# Patient Record
Sex: FEMALE | Race: WHITE | Employment: FULL TIME | URBAN - NONMETROPOLITAN AREA
[De-identification: names, ages, dates, MRNs, and addresses within clinical notes are randomized per-mention and may not be internally consistent; named-entity substitution may affect disease eponyms.]

---

## 2018-12-08 ENCOUNTER — HOSPITAL ENCOUNTER (OUTPATIENT)
Age: 58
Discharge: HOME | End: 2018-12-08
Payer: COMMERCIAL

## 2018-12-08 LAB
ALBUMIN SERPL-MCNC: 4.8 GM/DL (ref 3.4–5)
ALP BLD-CCNC: 89 IU/L (ref 40–129)
ALT SERPL-CCNC: 13 U/L (ref 10–40)
ANION GAP SERPL CALCULATED.3IONS-SCNC: 10 MMOL/L (ref 4–16)
AST SERPL-CCNC: 16 IU/L (ref 15–37)
BASOPHILS ABSOLUTE: 0 K/CU MM
BASOPHILS RELATIVE PERCENT: 0.5 % (ref 0–1)
BILIRUB SERPL-MCNC: 0.7 MG/DL (ref 0–1)
BUN BLDV-MCNC: 12 MG/DL (ref 6–23)
CALCIUM SERPL-MCNC: 9.8 MG/DL (ref 8.3–10.6)
CHLORIDE BLD-SCNC: 102 MMOL/L (ref 99–110)
CO2: 30 MMOL/L (ref 21–32)
CREAT SERPL-MCNC: 0.7 MG/DL (ref 0.6–1.1)
DIFFERENTIAL TYPE: ABNORMAL
EOSINOPHILS ABSOLUTE: 0.4 K/CU MM
EOSINOPHILS RELATIVE PERCENT: 5.3 % (ref 0–3)
GFR AFRICAN AMERICAN: >60 ML/MIN/1.73M2
GFR NON-AFRICAN AMERICAN: >60 ML/MIN/1.73M2
GLUCOSE FASTING: 90 MG/DL (ref 70–99)
HCT VFR BLD CALC: 41.8 % (ref 37–47)
HEMOGLOBIN: 13.4 GM/DL (ref 12.5–16)
IMMATURE NEUTROPHIL %: 0.3 % (ref 0–0.43)
LYMPHOCYTES ABSOLUTE: 1.6 K/CU MM
LYMPHOCYTES RELATIVE PERCENT: 23.7 % (ref 24–44)
MCH RBC QN AUTO: 32.2 PG (ref 27–31)
MCHC RBC AUTO-ENTMCNC: 32.1 % (ref 32–36)
MCV RBC AUTO: 100.5 FL (ref 78–100)
MONOCYTES ABSOLUTE: 0.5 K/CU MM
MONOCYTES RELATIVE PERCENT: 7.8 % (ref 0–4)
PDW BLD-RTO: 11.9 % (ref 11.7–14.9)
PLATELET # BLD: 308 K/CU MM (ref 140–440)
PMV BLD AUTO: 10 FL (ref 7.5–11.1)
POTASSIUM SERPL-SCNC: 4.1 MMOL/L (ref 3.5–5.1)
RBC # BLD: 4.16 M/CU MM (ref 4.2–5.4)
SEGMENTED NEUTROPHILS ABSOLUTE COUNT: 4.1 K/CU MM
SEGMENTED NEUTROPHILS RELATIVE PERCENT: 62.4 % (ref 36–66)
SODIUM BLD-SCNC: 142 MMOL/L (ref 135–145)
TOTAL IMMATURE NEUTOROPHIL: 0.02 K/CU MM
TOTAL PROTEIN: 6.9 GM/DL (ref 6.4–8.2)
WBC # BLD: 6.6 K/CU MM (ref 4–10.5)

## 2018-12-08 PROCEDURE — 84443 ASSAY THYROID STIM HORMONE: CPT

## 2018-12-08 PROCEDURE — 80061 LIPID PANEL: CPT

## 2018-12-08 PROCEDURE — 36415 COLL VENOUS BLD VENIPUNCTURE: CPT

## 2018-12-08 PROCEDURE — 80053 COMPREHEN METABOLIC PANEL: CPT

## 2018-12-08 PROCEDURE — 85025 COMPLETE CBC W/AUTO DIFF WBC: CPT

## 2018-12-08 PROCEDURE — 82306 VITAMIN D 25 HYDROXY: CPT

## 2018-12-09 LAB
CHOLESTEROL, FASTING: 181 MG/DL
HDLC SERPL-MCNC: 58 MG/DL
LDL CHOLESTEROL DIRECT: 107 MG/DL
TRIGLYCERIDE, FASTING: 183 MG/DL
TSH HIGH SENSITIVITY: 1.44 UIU/ML (ref 0.27–4.2)
VITAMIN D 25-HYDROXY: 50.7 NG/ML

## 2018-12-14 ENCOUNTER — HOSPITAL ENCOUNTER (OUTPATIENT)
Dept: WOMENS IMAGING | Age: 58
Discharge: HOME | End: 2018-12-14
Payer: COMMERCIAL

## 2018-12-14 DIAGNOSIS — M85.9 DISORDER OF BONE DENSITY AND STRUCTURE, UNSPECIFIED: ICD-10-CM

## 2018-12-14 PROCEDURE — 77080 DXA BONE DENSITY AXIAL: CPT

## 2018-12-28 ENCOUNTER — HOSPITAL ENCOUNTER (OUTPATIENT)
Dept: MRI IMAGING | Age: 58
Discharge: HOME | End: 2018-12-28
Payer: COMMERCIAL

## 2018-12-28 DIAGNOSIS — R92.2 BREAST DENSITY: ICD-10-CM

## 2018-12-28 LAB
GFR AFRICAN AMERICAN: >60 ML/MIN/1.73M2
GFR NON-AFRICAN AMERICAN: >60 ML/MIN/1.73M2
POC CREATININE: 0.6 MG/DL (ref 0.6–1.1)

## 2018-12-28 PROCEDURE — 6360000004 HC RX CONTRAST MEDICATION: Performed by: FAMILY MEDICINE

## 2018-12-28 PROCEDURE — C8908 MRI W/O FOL W/CONT, BREAST,: HCPCS

## 2018-12-28 PROCEDURE — A9577 INJ MULTIHANCE: HCPCS | Performed by: FAMILY MEDICINE

## 2018-12-28 RX ADMIN — GADOBENATE DIMEGLUMINE 12 ML: 529 INJECTION, SOLUTION INTRAVENOUS at 10:10

## 2019-02-18 ENCOUNTER — OFFICE VISIT (OUTPATIENT)
Dept: FAMILY MEDICINE CLINIC | Age: 59
End: 2019-02-18
Payer: COMMERCIAL

## 2019-02-18 VITALS
HEART RATE: 83 BPM | DIASTOLIC BLOOD PRESSURE: 84 MMHG | RESPIRATION RATE: 16 BRPM | HEIGHT: 66 IN | SYSTOLIC BLOOD PRESSURE: 120 MMHG | WEIGHT: 142 LBS | BODY MASS INDEX: 22.82 KG/M2

## 2019-02-18 DIAGNOSIS — F32.1 CURRENT MODERATE EPISODE OF MAJOR DEPRESSIVE DISORDER WITHOUT PRIOR EPISODE (HCC): ICD-10-CM

## 2019-02-18 DIAGNOSIS — Z13.31 POSITIVE DEPRESSION SCREENING: ICD-10-CM

## 2019-02-18 DIAGNOSIS — H81.13 BENIGN PAROXYSMAL POSITIONAL VERTIGO DUE TO BILATERAL VESTIBULAR DISORDER: ICD-10-CM

## 2019-02-18 DIAGNOSIS — R03.0 ELEVATED BLOOD PRESSURE READING: Primary | ICD-10-CM

## 2019-02-18 PROBLEM — E61.1 IRON DEFICIENCY: Status: ACTIVE | Noted: 2019-02-18

## 2019-02-18 PROBLEM — I10 ESSENTIAL HYPERTENSION: Status: ACTIVE | Noted: 2019-02-18

## 2019-02-18 PROBLEM — R42 VERTIGO: Status: ACTIVE | Noted: 2019-02-18

## 2019-02-18 PROCEDURE — G0444 DEPRESSION SCREEN ANNUAL: HCPCS | Performed by: PHYSICIAN ASSISTANT

## 2019-02-18 PROCEDURE — G8431 POS CLIN DEPRES SCRN F/U DOC: HCPCS | Performed by: PHYSICIAN ASSISTANT

## 2019-02-18 PROCEDURE — 99203 OFFICE O/P NEW LOW 30 MIN: CPT | Performed by: PHYSICIAN ASSISTANT

## 2019-02-18 RX ORDER — MECLIZINE HYDROCHLORIDE 25 MG/1
TABLET ORAL
COMMUNITY
Start: 2019-02-16 | End: 2020-12-27

## 2019-02-18 RX ORDER — BUPROPION HYDROCHLORIDE 150 MG/1
TABLET, EXTENDED RELEASE ORAL
Refills: 3 | COMMUNITY
Start: 2018-12-14 | End: 2020-12-27

## 2019-02-18 RX ORDER — DULOXETIN HYDROCHLORIDE 60 MG/1
CAPSULE, DELAYED RELEASE ORAL
COMMUNITY

## 2019-02-18 RX ORDER — FERROUS SULFATE 325(65) MG
65 TABLET ORAL
COMMUNITY

## 2019-02-18 ASSESSMENT — ENCOUNTER SYMPTOMS
COUGH: 0
SHORTNESS OF BREATH: 0
CHEST TIGHTNESS: 0
COLOR CHANGE: 0
VOMITING: 0
NAUSEA: 0
TROUBLE SWALLOWING: 0
DIARRHEA: 0
ABDOMINAL PAIN: 0

## 2019-02-18 ASSESSMENT — PATIENT HEALTH QUESTIONNAIRE - PHQ9
6. FEELING BAD ABOUT YOURSELF - OR THAT YOU ARE A FAILURE OR HAVE LET YOURSELF OR YOUR FAMILY DOWN: 0
SUM OF ALL RESPONSES TO PHQ QUESTIONS 1-9: 15
5. POOR APPETITE OR OVEREATING: 2
2. FEELING DOWN, DEPRESSED OR HOPELESS: 3
1. LITTLE INTEREST OR PLEASURE IN DOING THINGS: 3
4. FEELING TIRED OR HAVING LITTLE ENERGY: 3
SUM OF ALL RESPONSES TO PHQ QUESTIONS 1-9: 15
8. MOVING OR SPEAKING SO SLOWLY THAT OTHER PEOPLE COULD HAVE NOTICED. OR THE OPPOSITE, BEING SO FIGETY OR RESTLESS THAT YOU HAVE BEEN MOVING AROUND A LOT MORE THAN USUAL: 1
7. TROUBLE CONCENTRATING ON THINGS, SUCH AS READING THE NEWSPAPER OR WATCHING TELEVISION: 0
3. TROUBLE FALLING OR STAYING ASLEEP: 3
9. THOUGHTS THAT YOU WOULD BE BETTER OFF DEAD, OR OF HURTING YOURSELF: 0
SUM OF ALL RESPONSES TO PHQ9 QUESTIONS 1 & 2: 6
10. IF YOU CHECKED OFF ANY PROBLEMS, HOW DIFFICULT HAVE THESE PROBLEMS MADE IT FOR YOU TO DO YOUR WORK, TAKE CARE OF THINGS AT HOME, OR GET ALONG WITH OTHER PEOPLE: 2

## 2019-10-18 ENCOUNTER — HOSPITAL ENCOUNTER (OUTPATIENT)
Dept: WOMENS IMAGING | Age: 59
Discharge: HOME | End: 2019-10-18
Payer: COMMERCIAL

## 2019-10-18 DIAGNOSIS — Z12.31 VISIT FOR SCREENING MAMMOGRAM: ICD-10-CM

## 2019-10-18 PROCEDURE — 77063 BREAST TOMOSYNTHESIS BI: CPT

## 2020-12-27 ENCOUNTER — HOSPITAL ENCOUNTER (EMERGENCY)
Age: 60
Discharge: HOME | End: 2020-12-27
Payer: COMMERCIAL

## 2020-12-27 VITALS
HEIGHT: 65 IN | DIASTOLIC BLOOD PRESSURE: 66 MMHG | OXYGEN SATURATION: 96 % | WEIGHT: 140 LBS | HEART RATE: 77 BPM | BODY MASS INDEX: 23.32 KG/M2 | RESPIRATION RATE: 16 BRPM | SYSTOLIC BLOOD PRESSURE: 137 MMHG | TEMPERATURE: 98.7 F

## 2020-12-27 DIAGNOSIS — Z20.822 SUSPECTED COVID-19 VIRUS INFECTION: Primary | ICD-10-CM

## 2020-12-27 PROCEDURE — U0003 INFECTIOUS AGENT DETECTION BY NUCLEIC ACID (DNA OR RNA); SEVERE ACUTE RESPIRATORY SYNDROME CORONAVIRUS 2 (SARS-COV-2) (CORONAVIRUS DISEASE [COVID-19]), AMPLIFIED PROBE TECHNIQUE, MAKING USE OF HIGH THROUGHPUT TECHNOLOGIES AS DESCRIBED BY CMS-2020-01-R: HCPCS

## 2020-12-27 PROCEDURE — 99214 OFFICE O/P EST MOD 30 MIN: CPT | Performed by: NURSE PRACTITIONER

## 2020-12-27 PROCEDURE — 99212 OFFICE O/P EST SF 10 MIN: CPT

## 2020-12-27 RX ORDER — AZITHROMYCIN 250 MG/1
TABLET, FILM COATED ORAL
Qty: 6 TABLET | Refills: 0 | Status: SHIPPED | OUTPATIENT
Start: 2020-12-27 | End: 2021-01-01

## 2020-12-27 RX ORDER — BENZONATATE 200 MG/1
200 CAPSULE ORAL 3 TIMES DAILY PRN
Qty: 21 CAPSULE | Refills: 0 | Status: SHIPPED | OUTPATIENT
Start: 2020-12-27 | End: 2021-01-03

## 2020-12-27 RX ORDER — PREDNISONE 20 MG/1
20 TABLET ORAL 2 TIMES DAILY
Qty: 10 TABLET | Refills: 0 | Status: SHIPPED | OUTPATIENT
Start: 2020-12-27 | End: 2021-01-01

## 2020-12-27 ASSESSMENT — ENCOUNTER SYMPTOMS
ALLERGIC/IMMUNOLOGIC NEGATIVE: 1
EYE REDNESS: 0
NAUSEA: 0
EYE PAIN: 0
COUGH: 1
VOMITING: 0
SORE THROAT: 1
EYE DISCHARGE: 0
ABDOMINAL PAIN: 0
SHORTNESS OF BREATH: 0
BACK PAIN: 0
TROUBLE SWALLOWING: 0
CONSTIPATION: 0
DIARRHEA: 0
RHINORRHEA: 0

## 2020-12-27 ASSESSMENT — PAIN DESCRIPTION - PROGRESSION: CLINICAL_PROGRESSION: NOT CHANGED

## 2020-12-27 ASSESSMENT — PAIN DESCRIPTION - DESCRIPTORS: DESCRIPTORS: ACHING

## 2020-12-27 ASSESSMENT — PAIN DESCRIPTION - ONSET: ONSET: GRADUAL

## 2020-12-27 ASSESSMENT — PAIN DESCRIPTION - LOCATION: LOCATION: OTHER (COMMENT)

## 2020-12-27 NOTE — ED PROVIDER NOTES
[place] [address]  Urgent Care Encounter      CHIEF COMPLAINT       Chief Complaint   Patient presents with    Concern For COVID-19    Cough    Fever    Pharyngitis       Nurses Notes reviewed and I agree except as noted in the HPI. HISTORY OF PRESENT ILLNESS   Rosina Enciso is a 56 y.o. female who presents with 1 day of cough congestion, fever, runny nose, headache, body aches, fatigue and sore throat. Denies nausea vomiting or diarrhea, wheezing or stridor, chest or abdominal pain. Patient is a non-smoker with no history of asthma. REVIEW OF SYSTEMS     Review of Systems   Constitutional: Positive for activity change, appetite change, chills and fever. Negative for fatigue. HENT: Positive for congestion and sore throat. Negative for ear pain, postnasal drip, rhinorrhea and trouble swallowing. Eyes: Negative for pain, discharge and redness. Respiratory: Positive for cough. Negative for shortness of breath. Cardiovascular: Negative. Negative for chest pain. Gastrointestinal: Negative for abdominal pain, constipation, diarrhea, nausea and vomiting. Endocrine: Negative. Genitourinary: Negative for dysuria, frequency and urgency. Musculoskeletal: Positive for myalgias. Negative for arthralgias and back pain. Skin: Negative for rash. Allergic/Immunologic: Negative. Negative for environmental allergies. Neurological: Negative for dizziness, tremors, weakness and headaches. Hematological: Negative. Psychiatric/Behavioral: Negative for dysphoric mood and sleep disturbance. The patient is not nervous/anxious. PAST MEDICAL HISTORY         Diagnosis Date    B12 deficiency     Iron deficiency     Osteoporosis        SURGICAL HISTORY     Patient  has a past surgical history that includes Breast biopsy (Right).     CURRENT MEDICATIONS       Discharge Medication List as of 12/27/2020  3:04 PM      CONTINUE these medications which have NOT CHANGED Details   DULoxetine (CYMBALTA) 60 MG extended release capsule Cymbalta 60 mg capsule,delayed release   Take 1 capsule every day by oral route. Historical Med      vitamin D 1000 units CAPS Take 1,000 Units by mouthHistorical Med      ferrous sulfate 325 (65 Fe) MG tablet Take 65 mg by mouthHistorical Med      Multiple Vitamins-Minerals (MULTIVITAMIN ADULT PO) multivitaminHistorical Med      Cyanocobalamin 1000 MCG/ML KIT vitamin B12-folic acid injection solutionHistorical Med             ALLERGIES     Patient is has No Known Allergies. FAMILY HISTORY     Patient'sfamily history includes Cancer in her maternal grandmother and mother; No Known Problems in her father, maternal grandfather, paternal grandfather, and paternal grandmother. SOCIAL HISTORY     Patient  reports that she has never smoked. She has never used smokeless tobacco. She reports that she does not drink alcohol or use drugs. PHYSICAL EXAM     ED TRIAGE VITALS  BP: 137/66, Temp: 98.7 °F (37.1 °C), Pulse: 77, Resp: 16, SpO2: 96 %  Physical Exam  Vitals signs reviewed. Constitutional:       General: She is not in acute distress. Appearance: Normal appearance. She is well-developed. She is ill-appearing. She is not toxic-appearing or diaphoretic. HENT:      Head: Normocephalic. No right periorbital erythema or left periorbital erythema. Jaw: No trismus. Right Ear: Hearing, ear canal and external ear normal. Tympanic membrane is bulging. Left Ear: Hearing, ear canal and external ear normal. Tympanic membrane is bulging. Nose: Mucosal edema and congestion present. No rhinorrhea. Mouth/Throat:      Mouth: Mucous membranes are moist.      Dentition: Normal dentition. Pharynx: Uvula midline. Posterior oropharyngeal erythema present. Tonsils: No tonsillar exudate. 0 on the right. 0 on the left. Eyes:      General: Lids are normal.         Right eye: No discharge. Left eye: No discharge. Conjunctiva/sclera: Conjunctivae normal.      Right eye: Right conjunctiva is not injected. No chemosis. Left eye: No chemosis. Pupils: Pupils are equal, round, and reactive to light. Neck:      Musculoskeletal: Full passive range of motion without pain and normal range of motion. Vascular: No JVD. Trachea: Trachea normal.   Cardiovascular:      Rate and Rhythm: Normal rate and regular rhythm. Heart sounds: Normal heart sounds. No murmur. Pulmonary:      Effort: Pulmonary effort is normal. No respiratory distress. Breath sounds: Normal breath sounds. No stridor. No wheezing. Abdominal:      General: Bowel sounds are normal.      Palpations: Abdomen is soft. Tenderness: There is no abdominal tenderness. Musculoskeletal: Normal range of motion. General: No tenderness or signs of injury. Lymphadenopathy:      Cervical: No cervical adenopathy. Skin:     General: Skin is warm and dry. Capillary Refill: Capillary refill takes less than 2 seconds. Coloration: Skin is pale. Findings: No rash. Neurological:      Mental Status: She is oriented to person, place, and time. She is lethargic. GCS: GCS eye subscore is 4. GCS verbal subscore is 5. GCS motor subscore is 6. Cranial Nerves: No cranial nerve deficit. Coordination: Coordination normal.      Gait: Gait normal.   Psychiatric:         Mood and Affect: Mood is not anxious or depressed. Speech: Speech normal.         Behavior: Behavior normal. Behavior is not withdrawn or hyperactive. Behavior is cooperative. Thought Content: Thought content normal.         Judgment: Judgment normal.         DIAGNOSTIC RESULTS   Labs: No results found for this visit on 12/27/20.     IMAGING:    URGENT CARE COURSE:     Vitals:    12/27/20 1445   BP: 137/66   Pulse: 77   Resp: 16   Temp: 98.7 °F (37.1 °C)   TempSrc: Temporal   SpO2: 96%   Weight: 140 lb (63.5 kg)   Height: 5' 5\" (1.651 m) Medications - No data to display  PROCEDURES:  None  FINAL IMPRESSION      1. Suspected COVID-19 virus infection        DISPOSITION/PLAN   DISPOSITION Decision To Discharge 12/27/2020 03:03:06 PM    PATIENT REFERRED TO:  Veronika Keita DO  [address] [place] [address], [address]  Angelo Murguia [birth year]      As needed    DISCHARGE MEDICATIONS:  Discharge Medication List as of 12/27/2020  3:04 PM      START taking these medications    Details   azithromycin (ZITHROMAX) 250 MG tablet Take 2 tablets by mouth daily for 1 day, THEN 1 tablet daily for 4 days. , Disp-6 tablet, R-0Normal      predniSONE (DELTASONE) 20 MG tablet Take 1 tablet by mouth 2 times daily for 5 days, Disp-10 tablet, R-0Normal      benzonatate (TESSALON) 200 MG capsule Take 1 capsule by mouth 3 times daily as needed for Cough, Disp-21 capsule, R-0Normal           Discharge Medication List as of 12/27/2020  3:04 PM          LASHANDA Lopez CNP, APRN - CNP  12/27/20 1527

## 2020-12-28 ENCOUNTER — CARE COORDINATION (OUTPATIENT)
Dept: CARE COORDINATION | Age: 60
End: 2020-12-28

## 2020-12-28 LAB
PERFORMING LAB: ABNORMAL
REPORT: ABNORMAL
SARS-COV-2: DETECTED

## 2020-12-28 NOTE — CARE COORDINATION
Attempted outreach for SOPCT77 monitoring. Left message to return phone call to ambulatory care manager at [phone]. 2nd attempt to reach for covid19 monitoring. Left message to return phone call to ambulatory care manager at [phone]. If no call back, unable to complete follow up.

## 2020-12-29 NOTE — CARE COORDINATION
Patient contacted regarding [id number] diagnosis\". Discussed COVID-19 related testing which was available at this time. Test results were positive. Patient informed of results, if available? Yes    Care Transition Nurse/ Ambulatory Care Manager contacted the patient by telephone to perform post discharge assessment. Call within 2 business days of discharge: Yes. Verified name and  with patient as identifiers. Provided introduction to self, and explanation of the CTN/ACM role, and reason for call due to risk factors for infection and/or exposure to COVID-19. Symptoms reviewed with patient who verbalized the following symptoms: fatigue, cough and sore throat. Due to no new or worsening symptoms encounter was not routed to provider for escalation. Discussed follow-up appointments. If no appointment was previously scheduled, appointment scheduling offered: Yes  [facility] follow up appointment(s):   Future Appointments   Date Time Provider Xuan Dominique   2021  2:20 PM STR MAMMOGRAPHY [address] [facility] Radiolog   2021  2:50 PM [facility]     [facility] follow up appointment(s): patient calling PCP    Non-face-to-face services provided:  Obtained and reviewed discharge summary and/or continuity of care documents  loop enrollment     Advance Care Planning:   Does patient have an Advance Directive:  reviewed and current. Patient has following risk factors of: no known risk factors. CTN/ACM reviewed discharge instructions, medical action plan and red flags such as increased shortness of breath, increasing fever and signs of decompensation with patient who verbalized understanding. Discussed exposure protocols and quarantine with CDC Guidelines What to do if you are sick with coronavirus disease .  Patient was given an opportunity for questions and concerns.  The patient agrees to contact the [facility] exposure line [phone], [facility]

## 2021-01-02 ENCOUNTER — CARE COORDINATION (OUTPATIENT)
Dept: CASE MANAGEMENT | Age: 61
End: 2021-01-02

## 2021-01-02 NOTE — CARE COORDINATION
Yellow alert noted in Loop remote symptom monitoring program. Messaged patient to notify Israel Bowser if symptoms have worsened since yesterday. Sent comment:    \"Just wanted to let you know that The Get Well Team at [facility] ([facility]) has received your message. Thanks for letting us know. Please let us know if any of your symptoms are worse than yesterday, or if you have any questions or concerns and would like to speak with a nurse. We are available 8-4 M-F, and 9-1 S/S.  My number is [phone]\"     BENOIT PatelN, RN   [address] [place] [address] Transition Nurse  [phone]

## 2021-01-03 ENCOUNTER — CARE COORDINATION (OUTPATIENT)
Dept: CASE MANAGEMENT | Age: 61
End: 2021-01-03

## 2021-01-03 NOTE — CARE COORDINATION
Yellow alert noted in Loop remote symptom monitoring program. Messaged patient to notify Richardnarda Yokasta if symptoms have worsened since yesterday. Sent comment:    \"Just wanted to let you know that The Get Well Team at [facility] ([facility]) has received your message. Thanks for letting us know. Please let us know if any of your symptoms are worse than yesterday, or if you have any questions or concerns and would like to speak with a nurse. We are available 8-4 M-F, and 9-1 S/S.  My number is [phone]\"     BENOIT WagnerN, RN   [address] [place] [address] Transition Nurse  [phone]

## 2021-01-06 ENCOUNTER — CARE COORDINATION (OUTPATIENT)
Dept: CARE COORDINATION | Age: 61
End: 2021-01-06

## 2021-01-06 NOTE — CARE COORDINATION
Yellow alert noted in Loop remote symptom monitoring program. Messaged patient to notify Smooth James if symptoms have worsened since yesterday. Thank you for checking in with us. We would like to know if your symptoms are improving since you were seen? If they are not or if you would like to speak with a nurse, please let us know. Please make sure you are drinking plenty of water, eating nutritious meals, and getting plenty of rest. Seek emergency medical care immediately if you have trouble breathing, persistent pain or pressure in your chest.  Loop nurses are available M-F 8-4 and S/S 9-1. We hope that you are improving each day.  Latosha Chavira RN

## 2021-01-18 ENCOUNTER — HOSPITAL ENCOUNTER (OUTPATIENT)
Dept: WOMENS IMAGING | Age: 61
Discharge: HOME | End: 2021-01-18
Payer: COMMERCIAL

## 2021-01-18 DIAGNOSIS — Z12.31 VISIT FOR SCREENING MAMMOGRAM: ICD-10-CM

## 2021-01-18 DIAGNOSIS — M81.0 OSTEOPOROSIS, UNSPECIFIED OSTEOPOROSIS TYPE, UNSPECIFIED PATHOLOGICAL FRACTURE PRESENCE: ICD-10-CM

## 2021-01-18 DIAGNOSIS — M85.9 DISORDER OF BONE DENSITY AND STRUCTURE, UNSPECIFIED: ICD-10-CM

## 2021-01-18 PROCEDURE — 77067 SCR MAMMO BI INCL CAD: CPT

## 2021-01-18 PROCEDURE — 77080 DXA BONE DENSITY AXIAL: CPT

## 2021-01-19 ENCOUNTER — HOSPITAL ENCOUNTER (OUTPATIENT)
Dept: MRI IMAGING | Age: 61
Discharge: HOME | End: 2021-01-19

## 2021-01-19 ENCOUNTER — HOSPITAL ENCOUNTER (OUTPATIENT)
Dept: WOMENS IMAGING | Age: 61
Discharge: HOME | End: 2021-01-19

## 2021-01-19 DIAGNOSIS — Z00.6 ENCOUNTER FOR EXAMINATION FOR NORMAL COMPARISON OR CONTROL IN CLINICAL RESEARCH PROGRAM: ICD-10-CM

## 2021-01-19 DIAGNOSIS — Z00.00 EXAMINATION: ICD-10-CM

## 2022-01-20 ENCOUNTER — HOSPITAL ENCOUNTER (EMERGENCY)
Age: 62
Discharge: HOME | End: 2022-01-20
Payer: COMMERCIAL

## 2022-01-20 VITALS
SYSTOLIC BLOOD PRESSURE: 143 MMHG | HEART RATE: 78 BPM | HEIGHT: 66 IN | BODY MASS INDEX: 22.5 KG/M2 | WEIGHT: 140 LBS | DIASTOLIC BLOOD PRESSURE: 88 MMHG | RESPIRATION RATE: 16 BRPM | TEMPERATURE: 98.7 F | OXYGEN SATURATION: 98 %

## 2022-01-20 DIAGNOSIS — R53.83 FATIGUE, UNSPECIFIED TYPE: ICD-10-CM

## 2022-01-20 DIAGNOSIS — Z20.822 LAB TEST NEGATIVE FOR COVID-19 VIRUS: ICD-10-CM

## 2022-01-20 DIAGNOSIS — J06.9 VIRAL URI WITH COUGH: Primary | ICD-10-CM

## 2022-01-20 LAB — SARS-COV-2, NAA: NOT  DETECTED

## 2022-01-20 PROCEDURE — G0463 HOSPITAL OUTPT CLINIC VISIT: HCPCS

## 2022-01-20 PROCEDURE — 87635 SARS-COV-2 COVID-19 AMP PRB: CPT

## 2022-01-20 PROCEDURE — 99213 OFFICE O/P EST LOW 20 MIN: CPT | Performed by: NURSE PRACTITIONER

## 2022-01-20 PROCEDURE — 99213 OFFICE O/P EST LOW 20 MIN: CPT

## 2022-01-20 RX ORDER — PREDNISONE 20 MG/1
20 TABLET ORAL 2 TIMES DAILY
Qty: 10 TABLET | Refills: 0 | Status: SHIPPED | OUTPATIENT
Start: 2022-01-20 | End: 2022-01-25

## 2022-01-20 RX ORDER — DEXTROMETHORPHAN HYDROBROMIDE AND PROMETHAZINE HYDROCHLORIDE 15; 6.25 MG/5ML; MG/5ML
5 SYRUP ORAL 4 TIMES DAILY PRN
Qty: 120 ML | Refills: 0 | Status: SHIPPED | OUTPATIENT
Start: 2022-01-20 | End: 2022-01-27

## 2022-01-20 RX ORDER — BUPROPION HYDROCHLORIDE 75 MG/1
75 TABLET ORAL 2 TIMES DAILY
COMMUNITY

## 2022-01-20 ASSESSMENT — ENCOUNTER SYMPTOMS
COUGH: 1
VOMITING: 0
SHORTNESS OF BREATH: 0
TROUBLE SWALLOWING: 0
RHINORRHEA: 0
DIARRHEA: 0
SORE THROAT: 0
BACK PAIN: 0
SINUS PRESSURE: 0
NAUSEA: 0

## 2022-01-20 ASSESSMENT — PAIN - FUNCTIONAL ASSESSMENT: PAIN_FUNCTIONAL_ASSESSMENT: PREVENTS OR INTERFERES SOME ACTIVE ACTIVITIES AND ADLS

## 2022-01-20 ASSESSMENT — PAIN SCALES - GENERAL: PAINLEVEL_OUTOF10: 5

## 2022-01-20 ASSESSMENT — PAIN DESCRIPTION - DESCRIPTORS: DESCRIPTORS: ACHING

## 2022-01-20 ASSESSMENT — PAIN DESCRIPTION - PAIN TYPE: TYPE: ACUTE PAIN

## 2022-01-20 ASSESSMENT — PAIN DESCRIPTION - LOCATION: LOCATION: GENERALIZED

## 2022-01-20 NOTE — ED PROVIDER NOTES
[place] [address]  Urgent Care Encounter       CHIEF COMPLAINT       Chief Complaint   Patient presents with    Cough    Fatigue       Nurses Notes reviewed and I agree except as noted in the HPI. HISTORY OF PRESENT ILLNESS   Cherry Mosley is a 57 y.o. female who presents to the urgent care center complaining of fatigue and nonproductive cough. Patient also complains of body aches and chills #5 on a 10 scale. The symptoms started 5 days ago. The history is provided by the patient. No  was used. Cough  Cough characteristics:  Non-productive  Sputum characteristics:  Unable to specify  Onset quality:  Sudden  Duration:  5 days  Timing:  Intermittent  Progression:  Waxing and waning  Chronicity:  New  Smoker: no    Relieved by:  None tried  Worsened by:  Nothing  Ineffective treatments:  None tried  Associated symptoms: no chest pain, no chills, no ear pain, no fever, no headaches, no rash, no rhinorrhea, no shortness of breath and no sore throat        REVIEW OF SYSTEMS     Review of Systems   Constitutional: Positive for activity change and fatigue. Negative for appetite change, chills and fever. HENT: Negative for congestion, ear pain, rhinorrhea, sinus pressure, sore throat and trouble swallowing. Respiratory: Positive for cough. Negative for shortness of breath. Cardiovascular: Negative for chest pain. Gastrointestinal: Negative for diarrhea, nausea and vomiting. Musculoskeletal: Negative for back pain and neck stiffness. Skin: Negative for rash. Allergic/Immunologic: Negative for environmental allergies. Neurological: Negative for dizziness, light-headedness and headaches. Hematological: Negative for adenopathy. PAST MEDICAL HISTORY         Diagnosis Date    B12 deficiency     Iron deficiency     Osteoporosis        SURGICALHISTORY     Patient  has a past surgical history that includes Breast biopsy (Right).     CURRENT MEDICATIONS       Discharge Medication List as of 1/20/2022  7:03 PM      CONTINUE these medications which have NOT CHANGED    Details   TRAZODONE HCL PO Take by mouthHistorical Med      buPROPion (WELLBUTRIN) 75 MG tablet Take 75 mg by mouth 2 times dailyHistorical Med      DULoxetine (CYMBALTA) 60 MG extended release capsule Cymbalta 60 mg capsule,delayed release   Take 1 capsule every day by oral route. Historical Med      vitamin D 1000 units CAPS Take 1,000 Units by mouthHistorical Med      ferrous sulfate 325 (65 Fe) MG tablet Take 65 mg by mouthHistorical Med      Multiple Vitamins-Minerals (MULTIVITAMIN ADULT PO) multivitaminHistorical Med      Cyanocobalamin 1000 MCG/ML KIT vitamin B12-folic acid injection solutionHistorical Med             ALLERGIES     Patient is has No Known Allergies. Patients   There is no immunization history on file for this patient. FAMILY HISTORY     Patient's family history includes Cancer in her maternal grandmother and mother; No Known Problems in her father, maternal grandfather, paternal grandfather, and paternal grandmother. SOCIAL HISTORY     Patient  reports that she has never smoked. She has never used smokeless tobacco. She reports that she does not drink alcohol and does not use drugs. PHYSICAL EXAM     ED TRIAGE VITALS  BP: (!) 143/88, Temp: 98.7 °F (37.1 °C), Pulse: 78, Resp: 16, SpO2: 98 %,Estimated body mass index is 22.94 kg/m² as calculated from the following:    Height as of this encounter: 5' 5.5\" (1.664 m). Weight as of this encounter: 140 lb (63.5 kg). ,No LMP recorded. Patient is postmenopausal.    Physical Exam  Vitals and nursing note reviewed. Constitutional:       General: She is not in acute distress. Appearance: Normal appearance. She is well-developed and well-groomed. She is not ill-appearing, toxic-appearing or diaphoretic. HENT:      Head: Normocephalic.       Right Ear: Hearing, tympanic membrane, ear canal and external ear normal. No drainage, swelling or tenderness. No mastoid tenderness. Tympanic membrane is not erythematous. Left Ear: Hearing, ear canal and external ear normal. No drainage, swelling or tenderness. No mastoid tenderness. Tympanic membrane is not erythematous. Nose: Congestion and rhinorrhea present. Right Sinus: No maxillary sinus tenderness or frontal sinus tenderness. Left Sinus: No maxillary sinus tenderness or frontal sinus tenderness. Mouth/Throat:      Lips: Pink. Mouth: Mucous membranes are moist.      Pharynx: Uvula midline. No posterior oropharyngeal erythema or uvula swelling. Tonsils: No tonsillar exudate or tonsillar abscesses. Eyes:      Conjunctiva/sclera: Conjunctivae normal.      Pupils: Pupils are equal, round, and reactive to light. Cardiovascular:      Rate and Rhythm: Normal rate and regular rhythm. Heart sounds: Normal heart sounds. Pulmonary:      Effort: Pulmonary effort is normal. No accessory muscle usage. Breath sounds: Normal breath sounds. No decreased breath sounds, wheezing, rhonchi or rales. Chest:   Breasts:      Right: No supraclavicular adenopathy. Left: No supraclavicular adenopathy. Abdominal:      General: Bowel sounds are normal.      Palpations: Abdomen is soft. Tenderness: There is no abdominal tenderness. There is no right CVA tenderness, left CVA tenderness or guarding. Negative signs include Aguilera's sign. Musculoskeletal:      Cervical back: Full passive range of motion without pain and normal range of motion. No rigidity. No muscular tenderness. Normal range of motion. Lymphadenopathy:      Head:      Right side of head: No submental, submandibular, tonsillar, preauricular, posterior auricular or occipital adenopathy. Left side of head: No submental, submandibular, tonsillar, preauricular, posterior auricular or occipital adenopathy. Cervical: No cervical adenopathy.       Right cervical: No superficial, deep or posterior cervical adenopathy. Left cervical: No superficial, deep or posterior cervical adenopathy. Upper Body:      Right upper body: No supraclavicular adenopathy. Left upper body: No supraclavicular adenopathy. Skin:     General: Skin is warm and dry. Capillary Refill: Capillary refill takes less than 2 seconds. Findings: No rash. Neurological:      Mental Status: She is alert and oriented to person, place, and time. Psychiatric:         Mood and Affect: Mood normal.         Behavior: Behavior normal. Behavior is cooperative. DIAGNOSTIC RESULTS     Labs:  Results for orders placed or performed during the hospital encounter of 01/20/22   COVID-19, Rapid   Result Value Ref Range    SARS-CoV-2, MICHAEL NOT  DETECTED NOT DETECTED       IMAGING:    No orders to display         EKG:      URGENT CARE COURSE:     Vitals:    01/20/22 1821   BP: (!) 143/88   Pulse: 78   Resp: 16   Temp: 98.7 °F (37.1 °C)   TempSrc: Temporal   SpO2: 98%   Weight: 140 lb (63.5 kg)   Height: 5' 5.5\" (1.664 m)       Medications - No data to display         PROCEDURES:  None    FINAL IMPRESSION      1. Viral URI with cough    2. Fatigue, unspecified type    3. Lab test negative for COVID-19 virus          DISPOSITION/ PLAN      The patient/Patient representative was advised to rest, drink lots of fluids, take Motrin and Tylenol for any fever, chills generalized body aches. The patient was also advised to monitor urine output for signs of dehydration. If the patient develops any chest pain, shortness of breath, neck pain or stiffness or abdominal pain or any other concerns, the patient is to call 911 or go to the emergency department for further evaluation. If the patient does not experience any of the above symptoms he is to follow-up with his primary care provider in 2-3 days for reevaluation. The patient/Patient representative are agreeable to the treatment plan at this time. The patient left the urgent care center in stable condition.     PATIENT REFERRED TO:  Celestina Holstein BURNS,   [address] [place] [address], [address] / Jaswant Fears [id number]      DISCHARGE MEDICATIONS:  Discharge Medication List as of 1/20/2022  7:03 PM      START taking these medications    Details   predniSONE (DELTASONE) 20 MG tablet Take 1 tablet by mouth 2 times daily for 5 days, Disp-10 tablet, R-0Normal      promethazine-dextromethorphan (PROMETHAZINE-DM) 6.25-15 MG/5ML syrup Take 5 mLs by mouth 4 times daily as needed for Cough, Disp-120 mL, R-0Normal             Discharge Medication List as of 1/20/2022  7:03 PM          Discharge Medication List as of 1/20/2022  7:03 PM          LASHANDA Lopez CNP    (Please note that portions of this note were completed with a voice recognition program. Efforts were made to edit the dictations but occasionally words are mis-transcribed.)           LASHANDA Lopez CNP  01/20/22 1551

## 2022-01-25 ENCOUNTER — HOSPITAL ENCOUNTER (OUTPATIENT)
Dept: WOMENS IMAGING | Age: 62
Discharge: HOME | End: 2022-01-25
Payer: COMMERCIAL

## 2022-01-25 DIAGNOSIS — Z12.31 VISIT FOR SCREENING MAMMOGRAM: ICD-10-CM

## 2022-01-25 PROCEDURE — 77063 BREAST TOMOSYNTHESIS BI: CPT

## 2022-09-17 ENCOUNTER — HOSPITAL ENCOUNTER (EMERGENCY)
Age: 62
Discharge: HOME | End: 2022-09-17
Payer: COMMERCIAL

## 2022-09-17 VITALS
OXYGEN SATURATION: 98 % | WEIGHT: 140 LBS | DIASTOLIC BLOOD PRESSURE: 81 MMHG | HEART RATE: 70 BPM | BODY MASS INDEX: 22.5 KG/M2 | SYSTOLIC BLOOD PRESSURE: 147 MMHG | RESPIRATION RATE: 16 BRPM | TEMPERATURE: 97.8 F | HEIGHT: 66 IN

## 2022-09-17 DIAGNOSIS — R42 VERTIGO: Primary | ICD-10-CM

## 2022-09-17 PROCEDURE — 99212 OFFICE O/P EST SF 10 MIN: CPT | Performed by: NURSE PRACTITIONER

## 2022-09-17 PROCEDURE — 99213 OFFICE O/P EST LOW 20 MIN: CPT

## 2022-09-17 RX ORDER — MECLIZINE HYDROCHLORIDE 25 MG/1
25 TABLET ORAL 3 TIMES DAILY PRN
Qty: 15 TABLET | Refills: 0 | Status: SHIPPED | OUTPATIENT
Start: 2022-09-17 | End: 2022-09-27

## 2022-09-17 RX ORDER — PREDNISONE 20 MG/1
20 TABLET ORAL 2 TIMES DAILY
Qty: 10 TABLET | Refills: 0 | Status: SHIPPED | OUTPATIENT
Start: 2022-09-17 | End: 2022-09-22

## 2022-09-17 RX ORDER — PREDNISONE 10 MG/1
20 TABLET ORAL ONCE
Status: DISCONTINUED | OUTPATIENT
Start: 2022-09-17 | End: 2022-09-17

## 2022-09-17 ASSESSMENT — ENCOUNTER SYMPTOMS
BLOOD IN STOOL: 0
DIARRHEA: 0
NAUSEA: 0
CHOKING: 0
VISUAL CHANGE: 0
COUGH: 0
APNEA: 0
VOMITING: 0
SHORTNESS OF BREATH: 0
WHEEZING: 0
STRIDOR: 0
CHEST TIGHTNESS: 0

## 2022-09-17 ASSESSMENT — PAIN - FUNCTIONAL ASSESSMENT: PAIN_FUNCTIONAL_ASSESSMENT: NONE - DENIES PAIN

## 2022-09-17 NOTE — DISCHARGE INSTRUCTIONS
The parent or patient representative was advised that at this point the patient can be treated safely at home, the parent or Patient representative should be aware of the following symptoms. The patient is to go slow from lying sitting and standing and the patient was also advised to not drive or operate heavy machinery while experiencing vertigo. The patient was also advised that if he develops any headaches, visual changes, slurred speech, dysphagia,weakness of extremities or any other symptoms the patient is to dial 911 or go directly to the emergency department. the patient or patient's representative are agreeable to the management of care at this time. They were advised to follow-up with her primary care provider in 2-3 days if no changes or go to the emergency department for evaluation. The patient/Patient representative are agreeable to the treatment plan and left ambulatory without any problems.

## 2022-09-17 NOTE — ED PROVIDER NOTES
[place] [id number]  Urgent Care Encounter      CHIEF COMPLAINT       Chief Complaint   Patient presents with    Dizziness       Nurses Notes reviewed and I agree except as noted in the HPI. HISTORY OFPRESENT ILLNESS   Kelton Curling is a 57 y.o. The history is provided by the patient. No  was used. Dizziness  Quality:  Head spinning  Severity:  Moderate  Onset quality:  Gradual  Duration:  10 days  Timing:  Constant  Progression:  Unchanged  Chronicity:  New  Context: bending over, head movement, physical activity and standing up    Context: not with bowel movement, not with ear pain, not with eye movement, not with inactivity, not with loss of consciousness, not with medication and not when urinating    Worsened by:  Eye movement, lying down, sitting upright, standing up, turning head, movement, medication, closing eyes and being still  Ineffective treatments:  None tried  Associated symptoms: no blood in stool, no chest pain, no diarrhea, no headaches, no hearing loss, no nausea, no palpitations, no shortness of breath, no syncope, no tinnitus, no vision changes, no vomiting and no weakness    Associated symptoms comment:  Pain in eyes, had covid while on vacarion  Risk factors: no anemia, no heart disease, no hx of stroke, no hx of vertigo, no Meniere's disease, no multiple medications and no new medications      REVIEW OF SYSTEMS     Review of Systems   Constitutional:  Negative for activity change, appetite change, chills, diaphoresis, fatigue, fever and unexpected weight change. HENT:  Negative for hearing loss and tinnitus. Respiratory:  Negative for apnea, cough, choking, chest tightness, shortness of breath, wheezing and stridor. Cardiovascular:  Negative for chest pain, palpitations, leg swelling and syncope. Gastrointestinal:  Negative for blood in stool, diarrhea, nausea and vomiting. Neurological:  Positive for dizziness.  Negative for weakness and headaches. PAST MEDICAL HISTORY         Diagnosis Date    B12 deficiency     Iron deficiency     Osteoporosis        SURGICAL HISTORY     Patient  has a past surgical history that includes Breast biopsy (Right, 1996). CURRENT MEDICATIONS       Discharge Medication List as of 9/17/2022  2:31 PM        CONTINUE these medications which have NOT CHANGED    Details   TRAZODONE HCL PO Take by mouthHistorical Med      buPROPion (WELLBUTRIN) 75 MG tablet Take 75 mg by mouth 2 times dailyHistorical Med      DULoxetine (CYMBALTA) 60 MG extended release capsule Cymbalta 60 mg capsule,delayed release   Take 1 capsule every day by oral route. Historical Med      vitamin D 1000 units CAPS Take 1,000 Units by mouthHistorical Med      ferrous sulfate 325 (65 Fe) MG tablet Take 65 mg by mouthHistorical Med      Multiple Vitamins-Minerals (MULTIVITAMIN ADULT PO) multivitaminHistorical Med      Cyanocobalamin 1000 MCG/ML KIT vitamin B12-folic acid injection solutionHistorical Med             ALLERGIES     Patient is has No Known Allergies. FAMILY HISTORY     Patient's family history includes Cancer in her maternal grandmother and mother; No Known Problems in her father, maternal grandfather, paternal grandfather, and paternal grandmother. SOCIAL HISTORY     Patient  reports that she has never smoked. She has never used smokeless tobacco. She reports that she does not drink alcohol and does not use drugs. PHYSICAL EXAM     ED TRIAGE VITALS  BP: (!) 147/81, Temp: 97.8 °F (36.6 °C), Heart Rate: 70, Resp: 16, SpO2: 98 %  Physical Exam  Vitals and nursing note reviewed. Constitutional:       General: She is not in acute distress. Appearance: Normal appearance. She is normal weight. She is not ill-appearing, toxic-appearing or diaphoretic. HENT:      Head: Normocephalic and atraumatic. Right Ear: Hearing, tympanic membrane, ear canal and external ear normal. There is no impacted cerumen.       Left Ear: Hearing, tympanic membrane, ear canal and external ear normal. There is impacted cerumen. Nose: Nose normal. No congestion or rhinorrhea. Mouth/Throat:      Mouth: Mucous membranes are moist.      Pharynx: Oropharynx is clear. No oropharyngeal exudate or posterior oropharyngeal erythema. Eyes:      General:         Right eye: No discharge. Left eye: No discharge. Extraocular Movements: Extraocular movements intact. Conjunctiva/sclera: Conjunctivae normal.   Pulmonary:      Effort: Pulmonary effort is normal.   Musculoskeletal:         General: Normal range of motion. Cervical back: Normal range of motion. Neurological:      General: No focal deficit present. Mental Status: She is alert and oriented to person, place, and time. Cranial Nerves: No cranial nerve deficit. Sensory: No sensory deficit. Motor: No weakness. Coordination: Coordination normal.      Gait: Gait normal.      Deep Tendon Reflexes: Reflexes normal.   Psychiatric:         Mood and Affect: Mood normal.         Behavior: Behavior normal.         Thought Content: Thought content normal.         Judgment: Judgment normal.       DIAGNOSTIC RESULTS   Labs:No results found for this visit on 09/17/22. IMAGING:  No orders to display     URGENT CARE COURSE:     Vitals:    09/17/22 1407   BP: (!) 147/81   Pulse: 70   Resp: 16   Temp: 97.8 °F (36.6 °C)   TempSrc: Temporal   SpO2: 98%   Weight: 140 lb (63.5 kg)   Height: 5' 5.5\" (1.664 m)       Medications - No data to display    PROCEDURES:  None  FINAL IMPRESSION      1. Vertigo        DISPOSITION/PLAN   Decision To Discharge     The parent or patient representative was advised that at this point the patient can be treated safely at home, the parent or Patient representative should be aware of the following symptoms.   The patient is to go slow from lying sitting and standing and the patient was also advised to not drive or operate heavy machinery while experiencing vertigo. The patient was also advised that if he develops any headaches, visual changes, slurred speech, dysphagia,weakness of extremities or any other symptoms the patient is to dial 911 or go directly to the emergency department. the patient or patient's representative are agreeable to the management of care at this time. They were advised to follow-up with her primary care provider in 2-3 days if no changes or go to the emergency department for evaluation. The patient/Patient representative are agreeable to the treatment plan and left ambulatory without any problems.      PATIENT REFERRED TO:  Antonia Rosenberg DO  [address] [place] [address], [id number]  [facility] 30-34-03-64    Schedule an appointment as soon as possible for a visit     DISCHARGE MEDICATIONS:  Discharge Medication List as of 9/17/2022  2:31 PM        START taking these medications    Details   meclizine (ANTIVERT) 25 MG tablet Take 1 tablet by mouth 3 times daily as needed for Dizziness, Disp-15 tablet, R-0Normal      predniSONE (DELTASONE) 20 MG tablet Take 1 tablet by mouth 2 times daily for 5 days, Disp-10 tablet, R-0Normal           Discharge Medication List as of 9/17/2022  2:31 PM          LASHANDA Acevedo CNP, APRN - CNP  09/17/22 5116

## 2022-09-17 NOTE — ED NOTES
Patient understood instructions verbally,  Follow up with PCP with any concerns e-scripts, Worse dizzsiness symptoms follow up with ED.ambulated self to lobby,stable condition. All belongings with patient.      Cameron Cleary LPN  55/81/21 6659

## 2022-09-21 ENCOUNTER — HOSPITAL ENCOUNTER (EMERGENCY)
Age: 62
Discharge: HOME | End: 2022-09-21
Payer: COMMERCIAL

## 2022-09-21 VITALS
OXYGEN SATURATION: 95 % | TEMPERATURE: 98.2 F | RESPIRATION RATE: 16 BRPM | HEART RATE: 77 BPM | SYSTOLIC BLOOD PRESSURE: 169 MMHG | DIASTOLIC BLOOD PRESSURE: 79 MMHG

## 2022-09-21 DIAGNOSIS — J06.9 VIRAL URI WITH COUGH: Primary | ICD-10-CM

## 2022-09-21 PROCEDURE — 99213 OFFICE O/P EST LOW 20 MIN: CPT | Performed by: NURSE PRACTITIONER

## 2022-09-21 PROCEDURE — 99213 OFFICE O/P EST LOW 20 MIN: CPT

## 2022-09-21 RX ORDER — DEXTROMETHORPHAN HYDROBROMIDE AND PROMETHAZINE HYDROCHLORIDE 15; 6.25 MG/5ML; MG/5ML
5 SYRUP ORAL 4 TIMES DAILY PRN
Qty: 120 ML | Refills: 0 | Status: SHIPPED | OUTPATIENT
Start: 2022-09-21 | End: 2022-09-28

## 2022-09-21 ASSESSMENT — ENCOUNTER SYMPTOMS
CHEST TIGHTNESS: 0
EYE DISCHARGE: 0
COUGH: 1
SORE THROAT: 1
TROUBLE SWALLOWING: 0
SINUS PAIN: 0
NAUSEA: 0
VOMITING: 0
WHEEZING: 0
SHORTNESS OF BREATH: 0
EYE REDNESS: 0
DIARRHEA: 0
RHINORRHEA: 1

## 2022-09-21 ASSESSMENT — PAIN - FUNCTIONAL ASSESSMENT: PAIN_FUNCTIONAL_ASSESSMENT: 0-10

## 2022-09-21 ASSESSMENT — PAIN SCALES - GENERAL: PAINLEVEL_OUTOF10: 6

## 2022-09-21 NOTE — ED NOTES
To [facility] with complaints of cough and sore throat for 2 days. States she was here Saturday for dizziness and is on a steroid for that and that is better.  Had covid Aug 15     Sterling Cutler, [facility]  09/21/22 4016

## 2022-09-21 NOTE — ED PROVIDER NOTES
[address] Encounter      [facility]       Chief Complaint   Patient presents with    Cough    Pharyngitis       Nurses Notes reviewed and I agree except as noted in the HPI. HISTORY OF PRESENT ILLNESS   Carmen Laureano is a 57 y.o. female who presents for evaluation of cough and sore throat. Onset 2 days ago, unchanged. Cough is intermittent, dry. Associated sinus/chest congestion and sore throat. Sore throat with coughing/swallowing. Cough is keeping her up at night. No fever, wheezing, shortness of breath. No chest pain, palpitations. Patient diagnosed with COVID August 2022. No recent exposure to strep or influenza. No improvement with Delsym. REVIEW OF SYSTEMS     Review of Systems   Constitutional:  Negative for chills, diaphoresis, fatigue and fever. HENT:  Positive for congestion, postnasal drip, rhinorrhea and sore throat. Negative for ear pain, sinus pain and trouble swallowing. Eyes:  Negative for discharge and redness. Respiratory:  Positive for cough. Negative for chest tightness, shortness of breath and wheezing. Cardiovascular:  Negative for chest pain. Gastrointestinal:  Negative for diarrhea, nausea and vomiting. Genitourinary:  Negative for decreased urine volume. Musculoskeletal:  Negative for neck pain and neck stiffness. Skin:  Negative for rash. Neurological:  Negative for headaches. Hematological:  Negative for adenopathy. Psychiatric/Behavioral:  Negative for sleep disturbance. PAST MEDICAL HISTORY         Diagnosis Date    B12 deficiency     Iron deficiency     Osteoporosis        SURGICAL HISTORY     Patient  has a past surgical history that includes Breast biopsy (Right, 1996).     CURRENT MEDICATIONS       Discharge Medication List as of 9/21/2022  6:00 PM        CONTINUE these medications which have NOT CHANGED    Details   meclizine (ANTIVERT) 25 MG tablet Take 1 tablet by mouth 3 times daily as erythematous or bulging. Nose: Nose normal.      Mouth/Throat:      Mouth: Mucous membranes are moist.      Pharynx: Oropharynx is clear. Uvula midline. Tonsils: No tonsillar abscesses. Eyes:      General: No scleral icterus. Conjunctiva/sclera: Conjunctivae normal.      Right eye: Right conjunctiva is not injected. No hemorrhage. Left eye: Left conjunctiva is not injected. No hemorrhage. Neck:      Thyroid: No thyromegaly. Trachea: Trachea normal.   Cardiovascular:      Rate and Rhythm: Normal rate and regular rhythm. No extrasystoles are present. Chest Wall: PMI is not displaced. Heart sounds: Normal heart sounds. No murmur heard. No friction rub. No gallop. Pulmonary:      Effort: Pulmonary effort is normal. No respiratory distress. Breath sounds: Normal breath sounds. Chest:   Breasts:     Right: No supraclavicular adenopathy. Left: No supraclavicular adenopathy. Musculoskeletal:      Cervical back: Normal range of motion and neck supple. Lymphadenopathy:      Head:      Right side of head: No submental, submandibular, tonsillar or occipital adenopathy. Left side of head: No submental, submandibular, tonsillar or occipital adenopathy. Cervical: No cervical adenopathy. Upper Body:      Right upper body: No supraclavicular adenopathy. Left upper body: No supraclavicular adenopathy. Skin:     General: Skin is warm and dry. Capillary Refill: Capillary refill takes less than 2 seconds. Coloration: Skin is not pale. Findings: No rash. Comments: Skin warm and dry to touch, no rashes noted on exposed surfaces. Neurological:      Mental Status: She is alert and oriented to person, place, and time. She is not disoriented. Psychiatric:         Mood and Affect: Mood normal.         Behavior: Behavior is cooperative. DIAGNOSTIC RESULTS   Labs: No results found for this visit on 09/21/22.     IMAGING:  No orders to display     URGENT CARE COURSE:     Vitals:    09/21/22 1741   BP: (!) 169/79   Pulse: 77   Resp: 16   Temp: 98.2 °F (36.8 °C)   TempSrc: Temporal   SpO2: 95%       Medications - No data to display  PROCEDURES:  None  FINALIMPRESSION      1. Viral URI with cough        DISPOSITION/PLAN   DISPOSITION Decision To Discharge 09/21/2022 05:59:47 PM  Nontoxic, no distress. Exam consistent with viral URI with cough. Medication as prescribed. Mucinex DM during the day. Increase fluids. If any distress go to ER. PATIENT REFERRED TO:  Luther Kat DO  [address] [place] [address], [address]  [facility] 30-34-03-64      Follow-up as needed. Medication as prescribed. Mucinex DM during the daytime. Increase fluids. If symptoms worsen go to ER.     DISCHARGE MEDICATIONS:  Discharge Medication List as of 9/21/2022  6:00 PM        START taking these medications    Details   promethazine-dextromethorphan (PROMETHAZINE-DM) 6.25-15 MG/5ML syrup Take 5 mLs by mouth 4 times daily as needed for Cough, Disp-120 mL, R-0Normal           Discharge Medication List as of 9/21/2022  6:00 PM          Vola SalinesLASHANDA - LIGIA         Vola Salines, LASHANDA Howard CNP  09/21/22 7017

## 2023-03-01 ENCOUNTER — HOSPITAL ENCOUNTER (OUTPATIENT)
Dept: WOMENS IMAGING | Age: 63
Discharge: HOME | End: 2023-03-01
Payer: COMMERCIAL

## 2023-03-01 DIAGNOSIS — Z12.31 VISIT FOR SCREENING MAMMOGRAM: ICD-10-CM

## 2023-03-01 DIAGNOSIS — M81.0 SENILE OSTEOPOROSIS: ICD-10-CM

## 2023-03-01 DIAGNOSIS — M81.0 AGE-RELATED OSTEOPOROSIS WITHOUT CURRENT PATHOLOGICAL FRACTURE: ICD-10-CM

## 2023-03-01 PROCEDURE — 77080 DXA BONE DENSITY AXIAL: CPT

## 2023-03-01 PROCEDURE — 77067 SCR MAMMO BI INCL CAD: CPT

## 2023-03-09 ENCOUNTER — TELEPHONE (OUTPATIENT)
Dept: PRIMARY CARE | Facility: CLINIC | Age: 63
End: 2023-03-09
Payer: COMMERCIAL

## 2023-03-09 NOTE — TELEPHONE ENCOUNTER
Notified patient Bone density performed at [facility] 3/1/23 shows osteoporosis, scheduled patient appointment to discuss.       Also notified patient recent mammogram performed is normal.

## 2023-03-14 ENCOUNTER — TELEPHONE (OUTPATIENT)
Dept: PRIMARY CARE | Facility: CLINIC | Age: 63
End: 2023-03-14
Payer: COMMERCIAL

## 2023-03-14 ENCOUNTER — HOSPITAL ENCOUNTER (OUTPATIENT)
Dept: AUDIOLOGY | Age: 63
Discharge: HOME | End: 2023-03-14
Payer: COMMERCIAL

## 2023-03-14 PROCEDURE — 92557 COMPREHENSIVE HEARING TEST: CPT | Performed by: AUDIOLOGIST

## 2023-03-14 PROCEDURE — 92567 TYMPANOMETRY: CPT | Performed by: AUDIOLOGIST

## 2023-03-14 NOTE — PROGRESS NOTES
AUDIOLOGICAL EVALUATION      REASON FOR TESTING:  Audiometric evaluation per the request of Dr. Rigoberto Kee, due to the diagnosis of hearing problem. Aicha Judge has noticed decreased hearing possibly more in her left ear than her right ear for about 6 months. She is listening to the TV at an increased volume and is having difficulty hearing conversation particularly from a distance. She occasionally notices intermittent humming tinnitus- she cannot localize it to a particular ear. Otalgia, otorrhea, dizziness,vertigo, ear fullness, and a history of noise exposure were denied. Her father did wear hearing aids in his older age. OTOSCOPY: clear canal.     AUDIOGRAM          Reliability: Good    COMMENTS: Mild to moderate sensorineural hearing loss for the left ear. Mild to moderate sensorineural hearing loss for the right ear. Word recognition ability is good at 96% for the left ear and excellent at 100% for the right ear when presented at a loud but comfortable listening level. Tympanometry revealed normal peak pressure and normal middle ear compliance for both ears. RECOMMENDATION(S):   1- Follow up with Dr. Rigoberto Kee regarding these results and any further testing or treatment recommendations. 2- Repeat testing as medically indicated or annually to monitor for progression, sooner with any significant changes or new concerns. 3- Binaural hearing aids are recommended pending medical clearance and patient motivation.

## 2023-03-15 ENCOUNTER — TELEPHONE (OUTPATIENT)
Dept: PRIMARY CARE | Facility: CLINIC | Age: 63
End: 2023-03-15
Payer: COMMERCIAL

## 2023-03-15 NOTE — TELEPHONE ENCOUNTER
Pt states she has left [facility] regarding ordering her Prolia but has not received a call back.  Do you know if it has been ordered?      Panda # [phone]

## 2023-03-16 NOTE — TELEPHONE ENCOUNTER
If pt has been getting prolia thru [facility] in the past; is it ok just to send it to them or should we go thru [facility]? It looks like Dr Bernard sent 11 refills so I would think the pt could just call the pharmacy & get it sent to us, but that was back in Mar of 2022 so I am not sure how long it is good for.

## 2023-03-17 NOTE — TELEPHONE ENCOUNTER
Spoke w/ [facility] pharmacy & they said that the refill is still good, but it needs authorization & they will be faxing that to us.

## 2023-03-23 PROBLEM — R92.2 DENSE BREASTS: Status: ACTIVE | Noted: 2023-03-23

## 2023-03-23 PROBLEM — M81.0 OSTEOPOROSIS: Status: ACTIVE | Noted: 2023-03-23

## 2023-03-23 PROBLEM — R92.30 DENSE BREASTS: Status: ACTIVE | Noted: 2023-03-23

## 2023-03-23 PROBLEM — D50.9 IRON DEFICIENCY ANEMIA: Status: ACTIVE | Noted: 2023-03-23

## 2023-03-23 PROBLEM — M85.9 DISORDER OF BONE DENSITY AND STRUCTURE, UNSPECIFIED: Status: ACTIVE | Noted: 2023-03-23

## 2023-03-23 PROBLEM — H91.90 HEARING PROBLEM: Status: ACTIVE | Noted: 2023-03-23

## 2023-03-23 PROBLEM — D64.9 ANEMIA: Status: ACTIVE | Noted: 2023-03-23

## 2023-03-23 PROBLEM — R30.0 DYSURIA: Status: ACTIVE | Noted: 2023-03-23

## 2023-03-23 PROBLEM — E55.9 VITAMIN D DEFICIENCY: Status: ACTIVE | Noted: 2023-03-23

## 2023-03-23 PROBLEM — F32.A DEPRESSION: Status: ACTIVE | Noted: 2023-03-23

## 2023-03-23 PROBLEM — F32.9 MAJOR DEPRESSIVE DISORDER: Status: ACTIVE | Noted: 2023-03-23

## 2023-03-23 PROBLEM — E53.8 VITAMIN B12 DEFICIENCY: Status: ACTIVE | Noted: 2023-03-23

## 2023-03-23 RX ORDER — CYANOCOBALAMIN 1000 UG/ML
0.5 INJECTION, SOLUTION INTRAMUSCULAR; SUBCUTANEOUS
COMMUNITY
End: 2023-12-21

## 2023-03-23 RX ORDER — DENOSUMAB 60 MG/ML
INJECTION SUBCUTANEOUS
COMMUNITY
Start: 2021-01-28 | End: 2023-11-21 | Stop reason: SDUPTHER

## 2023-03-23 RX ORDER — ACETAMINOPHEN 500 MG
50 TABLET ORAL DAILY
COMMUNITY
Start: 2013-10-22

## 2023-03-23 RX ORDER — KETOCONAZOLE 20 MG/G
CREAM TOPICAL 2 TIMES DAILY
COMMUNITY
Start: 2021-12-15 | End: 2023-03-28 | Stop reason: ALTCHOICE

## 2023-03-23 RX ORDER — BUPROPION HYDROCHLORIDE 150 MG/1
150 TABLET, EXTENDED RELEASE ORAL DAILY
COMMUNITY

## 2023-03-23 RX ORDER — TRAZODONE HYDROCHLORIDE 50 MG/1
1 TABLET ORAL NIGHTLY
COMMUNITY
Start: 2020-09-22 | End: 2023-04-24

## 2023-03-23 RX ORDER — MULTIVITAMIN
1 TABLET ORAL DAILY
COMMUNITY
Start: 2016-10-31

## 2023-03-23 RX ORDER — FERROUS SULFATE 325(65) MG
1 TABLET ORAL DAILY
COMMUNITY
Start: 2016-10-31

## 2023-03-23 RX ORDER — DULOXETIN HYDROCHLORIDE 60 MG/1
60 CAPSULE, DELAYED RELEASE ORAL DAILY
COMMUNITY
End: 2023-07-18

## 2023-03-23 NOTE — TELEPHONE ENCOUNTER
Panda called and she never heard back so she went a different route, she stated she left several messages but no one followed up. She would like clarification if this was approved or not,  Dr Jack Nieves is also trying to get approval, please contact her asap

## 2023-03-24 NOTE — TELEPHONE ENCOUNTER
Please see below.    PA Case: [id number], Status: Approved, Coverage Starts on: 3/25/2023 12:00:00 AM, Coverage Ends on: 3/24/2024    Auth is for a year.    Please contact pharmacy and let them know.     Josesito Dugan CMA  3/24/2023  Practice Supervisor  [facility]

## 2023-03-24 NOTE — TELEPHONE ENCOUNTER
PA has been submitted.    Josesito Dugan CMA  3/24/2023  Practice Supervisor  [facility]

## 2023-03-24 NOTE — TELEPHONE ENCOUNTER
Dr Nieves' office said that they have not started the prior auth yet so they would like us to proceed w/ ordering it & let the pt know.

## 2023-03-24 NOTE — TELEPHONE ENCOUNTER
ANNETTE w/ Dr aJck Nieves' office regarding Prolia injection because pt said that we are both working on getting it. Need to let pt know who is closer to getting it & then the other one will have to cancel. ([phone])

## 2023-03-24 NOTE — TELEPHONE ENCOUNTER
Dr Nieves' office returned my call & said that they have to find out where they are in the process of getting Prolia & they will get back to me.

## 2023-03-28 ENCOUNTER — TELEMEDICINE (OUTPATIENT)
Dept: PRIMARY CARE | Facility: CLINIC | Age: 63
End: 2023-03-28
Payer: COMMERCIAL

## 2023-03-28 VITALS — BODY MASS INDEX: 23.32 KG/M2 | WEIGHT: 140 LBS | HEIGHT: 65 IN

## 2023-03-28 DIAGNOSIS — M81.0 AGE-RELATED OSTEOPOROSIS WITHOUT CURRENT PATHOLOGICAL FRACTURE: Primary | ICD-10-CM

## 2023-03-28 PROBLEM — H81.13 BENIGN PAROXYSMAL POSITIONAL VERTIGO DUE TO BILATERAL VESTIBULAR DISORDER: Status: ACTIVE | Noted: 2019-02-18

## 2023-03-28 PROBLEM — F32.1 CURRENT MODERATE EPISODE OF MAJOR DEPRESSIVE DISORDER WITHOUT PRIOR EPISODE (MULTI): Status: ACTIVE | Noted: 2019-02-18

## 2023-03-28 PROBLEM — R03.0 ELEVATED BLOOD PRESSURE READING: Status: ACTIVE | Noted: 2019-02-18

## 2023-03-28 PROBLEM — E61.1 IRON DEFICIENCY: Status: ACTIVE | Noted: 2019-02-18

## 2023-03-28 PROCEDURE — 99214 OFFICE O/P EST MOD 30 MIN: CPT | Performed by: FAMILY MEDICINE

## 2023-03-28 RX ORDER — MECLIZINE HYDROCHLORIDE 25 MG/1
25 TABLET ORAL 3 TIMES DAILY PRN
COMMUNITY
Start: 2022-09-17 | End: 2023-03-28 | Stop reason: ALTCHOICE

## 2023-03-28 ASSESSMENT — ENCOUNTER SYMPTOMS
FEVER: 0
PALPITATIONS: 0
ACTIVITY CHANGE: 0
BACK PAIN: 0
FATIGUE: 0
FACIAL ASYMMETRY: 0
HEADACHES: 0
LIGHT-HEADEDNESS: 0
CHEST TIGHTNESS: 0
CHOKING: 0
APPETITE CHANGE: 0
ARTHRALGIAS: 0
DIZZINESS: 0
COUGH: 0
COLOR CHANGE: 0

## 2023-03-28 ASSESSMENT — PATIENT HEALTH QUESTIONNAIRE - PHQ9
2. FEELING DOWN, DEPRESSED OR HOPELESS: NOT AT ALL
1. LITTLE INTEREST OR PLEASURE IN DOING THINGS: NOT AT ALL
SUM OF ALL RESPONSES TO PHQ9 QUESTIONS 1 & 2: 0

## 2023-03-28 NOTE — PROGRESS NOTES
Subjective   Patient ID: Panda Valdes is a 63 y.o. female who presents for discussion about bone density.    HPI   Patient complains of osteoporosis. She was diagnosed with osteoporosis by bone density scan in 1/2021. Patient denies history of fracture. The cause of osteoporosis is felt to be due to postmenopausal estrogen deficiency and dietary calcium and/or vitamin D deficiency.  She is not currently being treated with calcium and vitamin D supplementation because she has had a hard time tolerating this espeically the calcium, does take her D supplement daily.  She is not currently being treated with bisphosphonates.   OSTEOPOROSIS risk factors (non-modifiable)  Personal Hx of fracture as an adult: yes  Hx of fracture in first-degree relative: yes   race: yes  Advanced age: no  Female sex: yes  Dementia: no  Poor health/frailty:no     OSTEOPOROSIS risk factors (potentially modifiable):  Tobacco use: no  Low body weight (<127 lbs): no  Estrogen deficiency     early menopause (age <45) or bilateral ovariectomy: yes     prolonged premenopausal amenorrhea (>1 yr): no  Low calcium intake (lifelong): yes  Alcoholism: no  Recurrent falls: no  Inadequate physical activity: yes    Current calcium and Vit D intake:  Dietary sources: does not have  supplements: MVI    Review of Systems   Constitutional:  Negative for activity change, appetite change, fatigue and fever.   HENT:  Negative for congestion.    Respiratory:  Negative for cough, choking and chest tightness.    Cardiovascular:  Negative for chest pain, palpitations and leg swelling.   Musculoskeletal:  Negative for arthralgias, back pain and gait problem.   Skin:  Negative for color change and pallor.   Neurological:  Negative for dizziness, facial asymmetry, light-headedness and headaches.         Objective   Physical Exam  Constitutional:       Appearance: Normal appearance.   Neurological:      Mental Status: She is alert.         No visits with  "results within 1 Year(s) from this visit.   Latest known visit with results is:   No results found for any previous visit.     Current Outpatient Medications on File Prior to Visit   Medication Sig Dispense Refill    buPROPion SR (Wellbutrin SR) 150 mg 12 hr tablet Take 1 tablet (150 mg) by mouth once daily.      cholecalciferol (Vitamin D-3) 50 mcg (2,000 unit) capsule Take 1 capsule (50 mcg) by mouth early in the morning..      cyanocobalamin (Vitamin B-12) 1,000 mcg/mL injection 0.5 mL (500 mcg) every 30 (thirty) days. INJECT 0.5 ML ONCE MONTHLY      denosumab (Prolia) 60 mg/mL syringe Inject under the skin. TO BE ADMINISTERED IN PHYSICIAN'S OFFICE. INJECT ONE SYRINGE SUBCOUSLY ONCE EVERY 6 MONTHS. REFRIGERATE. USE WITHIN 14 DAYS ONCE AT ROOM TEMPERATURE.      DULoxetine (Cymbalta) 60 mg DR capsule Take 1 capsule (60 mg) by mouth once daily.      ferrous sulfate 325 (65 Fe) MG tablet Take 1 tablet (325 mg) by mouth once daily.      ketoconazole (NIZOral) 2 % cream twice a day. APPLY SPARINGLY TO AFFECTED AREA(S) TWICE DAILY      multivitamin tablet Take 1 tablet by mouth once daily. Multivital-M TABS      syringe with needle 1 mL 25 gauge x 5/8\" syringe Use syringe Q month with B12 injections      traZODone (Desyrel) 50 mg tablet Take 1 tablet (50 mg) by mouth once daily at bedtime.       No current facility-administered medications on file prior to visit.     No images are attached to the encounter.      Results of prior DXA: osteoporosis    (Note: The National Osteoporosis Foundation recommends pharmacological treatment   of all postmenopausal women with T-scores below -2.0, and those with T-scores below -1.5   with risk factors for osteoporosis. Osteoporosis is present when the T-score is below -2.5. The T-score is defined as the number of standard deviations above or below the average BMD value for young, healthy white women.)      Assessment/Plan   There are no diagnoses linked to this encounter.     Is " pharmacological therapy indicated? yes  Which medication is selected: refused medication at this time  Which dosing regimen is selected: refusal    A. Contraindications to bisphosphonates: hypocalcemia, esophageal abnormalities, inability to remain upright for at least 30 min. post dose, renal insufficiency. Contraindications reviewed: no.    B. Patients on bisphosphonates should also receive adequate calcium and vitamin D supplementation. Patient advised regarding calcium and vitamin D supplements: yes. Patient advised not to take these supplements at same time as bisphosphonate due to inhibition of absorption: no.    C. Contraindications to SERM therapy: history of blood clots, may increase hot flashes and cause leg cramping.    Patient to start back on her prolia  Patient to continue on her calcium, vitamin d, and 30 minutes of weightbearing exercise daily  Patient to call if questions or concerns

## 2023-03-29 ENCOUNTER — APPOINTMENT (OUTPATIENT)
Dept: PRIMARY CARE | Facility: CLINIC | Age: 63
End: 2023-03-29
Payer: COMMERCIAL

## 2023-04-04 ENCOUNTER — TELEPHONE (OUTPATIENT)
Dept: PRIMARY CARE | Facility: CLINIC | Age: 63
End: 2023-04-04
Payer: COMMERCIAL

## 2023-04-04 NOTE — PROGRESS NOTES
Patient calls in asking for an update on her Prolia medication. She states Galina was working on this, but with Galina out of the office this week, she would like to know what is going on. I see previous conversations about the medication. It was prior-authed and approved. Galina called [facility] with the auth information back on 3/24. So I called [facility] and at first I was told this was held up in the benefits dept. That they needed to decided if it was being billed to her medical or pharmacy benefits. Then I called them back and got a different representative who looked into this further. First she said she only sees medical coverage with this insurance. But the card I see on file says medical, pharmacy and dental. Then they needed us to approve the shipment of the medicine, which I did approve for 4/12. Now they will work on the insurance and call patient within 24-48 hours to discuss any OOP cost. I advised patient to call my by Thursday if she has not heard anything from them and I will make another follow up call. We will wait to schedule the patient for an appt until we know the medicine has been received.

## 2023-04-28 ENCOUNTER — CLINICAL SUPPORT (OUTPATIENT)
Dept: PRIMARY CARE | Facility: CLINIC | Age: 63
End: 2023-04-28
Payer: COMMERCIAL

## 2023-04-28 DIAGNOSIS — M81.0 OSTEOPOROSIS, UNSPECIFIED OSTEOPOROSIS TYPE, UNSPECIFIED PATHOLOGICAL FRACTURE PRESENCE: ICD-10-CM

## 2023-04-28 PROCEDURE — 96372 THER/PROPH/DIAG INJ SC/IM: CPT | Performed by: FAMILY MEDICINE

## 2023-04-28 NOTE — PROGRESS NOTES
Pt presents for Prolia injection per Dr Bernard. 1 mL given subcutaneously in upper R arm; no issues w/ injection. Pt tolerated well.

## 2023-07-18 DIAGNOSIS — F32.9 MAJOR DEPRESSIVE DISORDER, SINGLE EPISODE, UNSPECIFIED: ICD-10-CM

## 2023-07-18 RX ORDER — DULOXETIN HYDROCHLORIDE 60 MG/1
CAPSULE, DELAYED RELEASE ORAL
Qty: 90 CAPSULE | Refills: 3 | Status: SHIPPED | OUTPATIENT
Start: 2023-07-18

## 2023-09-06 ENCOUNTER — TELEPHONE (OUTPATIENT)
Dept: PRIMARY CARE | Facility: CLINIC | Age: 63
End: 2023-09-06
Payer: COMMERCIAL

## 2023-09-06 ENCOUNTER — HOSPITAL ENCOUNTER (EMERGENCY)
Age: 63
Discharge: HOME | End: 2023-09-06
Payer: COMMERCIAL

## 2023-09-06 VITALS
SYSTOLIC BLOOD PRESSURE: 174 MMHG | HEART RATE: 69 BPM | WEIGHT: 140 LBS | RESPIRATION RATE: 18 BRPM | DIASTOLIC BLOOD PRESSURE: 104 MMHG | OXYGEN SATURATION: 98 % | TEMPERATURE: 97.8 F | BODY MASS INDEX: 22.94 KG/M2

## 2023-09-06 DIAGNOSIS — I10 HYPERTENSION, UNSPECIFIED TYPE: Primary | ICD-10-CM

## 2023-09-06 PROCEDURE — 99213 OFFICE O/P EST LOW 20 MIN: CPT

## 2023-09-06 PROCEDURE — 99203 OFFICE O/P NEW LOW 30 MIN: CPT | Performed by: NURSE PRACTITIONER

## 2023-09-06 ASSESSMENT — ENCOUNTER SYMPTOMS
COUGH: 0
CHEST TIGHTNESS: 0
VOMITING: 0
DIARRHEA: 0
SORE THROAT: 0
NAUSEA: 0
SHORTNESS OF BREATH: 0
RHINORRHEA: 0

## 2023-09-06 ASSESSMENT — PAIN - FUNCTIONAL ASSESSMENT: PAIN_FUNCTIONAL_ASSESSMENT: NONE - DENIES PAIN

## 2023-09-06 NOTE — TELEPHONE ENCOUNTER
Patient left a voicemail stating she has been experiencing some irregular blood pressure readings in the low 150's wanted to know if this was a concern and if she should be doing anything ?

## 2023-09-06 NOTE — ED TRIAGE NOTES
Patient ambulated to room with c/o elevated blood pressure x 2 days. States initial elevated reading a dentist office two days ago. Denies chest pain or shortness of breath.

## 2023-09-06 NOTE — ED PROVIDER NOTES
[address]  Urgent Care Encounter       CHIEF COMPLAINT       Chief Complaint   Patient presents with    Hypertension       Nurses Notes reviewed and I agree except as noted in the HPI. HISTORY OF PRESENT ILLNESS   Carmela Mitchell is a 70 y.o. female who presents to the [facility] urgent care for evaluation of hypertension. She reports that 2 days ago she had a dentist appointment. She reports at that time she had an elevated blood pressure reading in the 150s. She reports that her PCP is in [facility]. She reports that she sees the PCP once a year. She reports she saw her PCP in February and had a normal blood pressure. She denies headache, dizziness, or blurred vision. Denies chest pain, cough, or dyspnea. The history is provided by the patient. No  was used. REVIEW OF SYSTEMS     Review of Systems   Constitutional:  Negative for activity change, appetite change, chills, fatigue and fever. HENT:  Negative for ear discharge, ear pain, rhinorrhea and sore throat. Respiratory:  Negative for cough, chest tightness and shortness of breath. Cardiovascular:  Negative for chest pain. Gastrointestinal:  Negative for diarrhea, nausea and vomiting. Genitourinary:  Negative for dysuria. Skin:  Negative for rash. Allergic/Immunologic: Negative for environmental allergies and food allergies. Neurological:  Negative for dizziness and headaches. PAST MEDICAL HISTORY         Diagnosis Date    B12 deficiency     Iron deficiency     Osteoporosis        SURGICALHISTORY     Patient  has a past surgical history that includes Breast biopsy (Right, ) and US BREAST BIOPSY W LOC DEVICE 1ST LESION LEFT (Left, ).     CURRENT MEDICATIONS       Previous Medications    BUPROPION (WELLBUTRIN) 75 MG TABLET    Take 75 mg by mouth 2 times daily    CYANOCOBALAMIN 1000 MCG/ML KIT    vitamin B12-folic acid injection solution    DULOXETINE (CYMBALTA) 60 MG EXTENDED

## 2023-09-06 NOTE — PROGRESS NOTES
Patient calls back regarding her previous message of her BP being elevated. Her BP is usually around 130/76, but for the last several days it has been in the 150/ range. She denies any headaches, blurred vision or vision changes. Denies any facial flushing. Does not feel she is under any new stress or has any pain that is not being controlled. PCP said she needs to be evaluated. Patient does live 2.5 hours away. She asked if this is something she could go to an urgent care for as she wont be able to come into town until next week. I did suggest this would be a good idea. I think we should schedule her as well in the office for next week to follow up that urgent care visit. She was agreeable. Appt made for 9/13 with PCP.

## 2023-09-07 ENCOUNTER — OFFICE VISIT (OUTPATIENT)
Dept: INTERNAL MEDICINE CLINIC | Age: 63
End: 2023-09-07

## 2023-09-07 VITALS
WEIGHT: 139.8 LBS | OXYGEN SATURATION: 98 % | HEIGHT: 66 IN | BODY MASS INDEX: 22.47 KG/M2 | HEART RATE: 72 BPM | DIASTOLIC BLOOD PRESSURE: 96 MMHG | SYSTOLIC BLOOD PRESSURE: 148 MMHG

## 2023-09-07 DIAGNOSIS — D50.9 IRON DEFICIENCY ANEMIA, UNSPECIFIED IRON DEFICIENCY ANEMIA TYPE: ICD-10-CM

## 2023-09-07 DIAGNOSIS — F32.A DEPRESSION, UNSPECIFIED DEPRESSION TYPE: ICD-10-CM

## 2023-09-07 DIAGNOSIS — E53.8 B12 DEFICIENCY: ICD-10-CM

## 2023-09-07 DIAGNOSIS — I10 HYPERTENSION, UNSPECIFIED TYPE: Primary | ICD-10-CM

## 2023-09-07 RX ORDER — LISINOPRIL 10 MG/1
10 TABLET ORAL DAILY
Qty: 90 TABLET | Refills: 1 | Status: SHIPPED | OUTPATIENT
Start: 2023-09-07

## 2023-09-07 RX ORDER — MIRTAZAPINE 7.5 MG/1
7.5 TABLET, FILM COATED ORAL NIGHTLY
Qty: 90 TABLET | Refills: 1 | Status: SHIPPED | OUTPATIENT
Start: 2023-09-07

## 2023-09-07 RX ORDER — BUSPIRONE HYDROCHLORIDE 5 MG/1
5 TABLET ORAL 3 TIMES DAILY PRN
Qty: 30 TABLET | Refills: 0 | Status: SHIPPED | OUTPATIENT
Start: 2023-09-07 | End: 2023-10-07

## 2023-09-07 RX ORDER — DENOSUMAB 60 MG/ML
INJECTION SUBCUTANEOUS
COMMUNITY
Start: 2021-01-28

## 2023-09-07 RX ORDER — ATORVASTATIN CALCIUM 40 MG/1
40 TABLET, FILM COATED ORAL DAILY
Qty: 90 TABLET | Refills: 1 | Status: SHIPPED | OUTPATIENT
Start: 2023-09-07 | End: 2023-09-07 | Stop reason: ALTCHOICE

## 2023-09-07 RX ORDER — BUPROPION HYDROCHLORIDE 75 MG/1
75 TABLET ORAL DAILY
Qty: 15 TABLET | Refills: 0 | Status: SHIPPED | OUTPATIENT
Start: 2023-09-07

## 2023-09-07 SDOH — ECONOMIC STABILITY: INCOME INSECURITY: HOW HARD IS IT FOR YOU TO PAY FOR THE VERY BASICS LIKE FOOD, HOUSING, MEDICAL CARE, AND HEATING?: NOT HARD AT ALL

## 2023-09-07 SDOH — ECONOMIC STABILITY: FOOD INSECURITY: WITHIN THE PAST 12 MONTHS, THE FOOD YOU BOUGHT JUST DIDN'T LAST AND YOU DIDN'T HAVE MONEY TO GET MORE.: NEVER TRUE

## 2023-09-07 SDOH — ECONOMIC STABILITY: HOUSING INSECURITY
IN THE LAST 12 MONTHS, WAS THERE A TIME WHEN YOU DID NOT HAVE A STEADY PLACE TO SLEEP OR SLEPT IN A SHELTER (INCLUDING NOW)?: NO

## 2023-09-07 SDOH — ECONOMIC STABILITY: FOOD INSECURITY: WITHIN THE PAST 12 MONTHS, YOU WORRIED THAT YOUR FOOD WOULD RUN OUT BEFORE YOU GOT MONEY TO BUY MORE.: NEVER TRUE

## 2023-09-07 ASSESSMENT — PATIENT HEALTH QUESTIONNAIRE - PHQ9
1. LITTLE INTEREST OR PLEASURE IN DOING THINGS: 2
SUM OF ALL RESPONSES TO PHQ9 QUESTIONS 1 & 2: 3
6. FEELING BAD ABOUT YOURSELF - OR THAT YOU ARE A FAILURE OR HAVE LET YOURSELF OR YOUR FAMILY DOWN: 1
7. TROUBLE CONCENTRATING ON THINGS, SUCH AS READING THE NEWSPAPER OR WATCHING TELEVISION: 0
9. THOUGHTS THAT YOU WOULD BE BETTER OFF DEAD, OR OF HURTING YOURSELF: 0
SUM OF ALL RESPONSES TO PHQ QUESTIONS 1-9: 10
3. TROUBLE FALLING OR STAYING ASLEEP: 3
10. IF YOU CHECKED OFF ANY PROBLEMS, HOW DIFFICULT HAVE THESE PROBLEMS MADE IT FOR YOU TO DO YOUR WORK, TAKE CARE OF THINGS AT HOME, OR GET ALONG WITH OTHER PEOPLE: 0
8. MOVING OR SPEAKING SO SLOWLY THAT OTHER PEOPLE COULD HAVE NOTICED. OR THE OPPOSITE, BEING SO FIGETY OR RESTLESS THAT YOU HAVE BEEN MOVING AROUND A LOT MORE THAN USUAL: 0
4. FEELING TIRED OR HAVING LITTLE ENERGY: 3
2. FEELING DOWN, DEPRESSED OR HOPELESS: 1
5. POOR APPETITE OR OVEREATING: 0
SUM OF ALL RESPONSES TO PHQ QUESTIONS 1-9: 10

## 2023-09-07 NOTE — PATIENT INSTRUCTIONS
Get labs done day before next visit. Get medication of Remeron, Lisinopril and Lipitor. Do not take Trazodone. Get new medication of Wellbutrin at the dose of 75 mg for 2 weeks and then stop medication. Take Buspar 3x daily as needed for anxiety. Take Remeron 2-3 hours before going to bed. Check BP twice daily once in morning and at night, write it in journal.     Make appt with Dr. Marilia Blankenship psychiatrist and psychology. T(C): 36.8 (07-19-19 @ 05:17), Max: 36.8 (07-18-19 @ 17:49)  HR: 63 (07-19-19 @ 05:17) (63 - 78)  BP: 140/80 (07-19-19 @ 05:17) (116/64 - 140/80)  RR: 18 (07-19-19 @ 05:17) (17 - 18)  SpO2: 99% (07-19-19 @ 05:17) (99% - 100%)

## 2023-09-07 NOTE — PROGRESS NOTES
Vitamin D Deficiency: Daily 1000 units tablet. Vit D lvl: 1/2023: 32. Will recheck Vit D levels. Mild to moderate sensorineural hearing loss: Was seen Dr. Ck Norwood. Audiology evaluation 03/2023. Routine examination:  Mammogram 03/2023: Normal.   DEXA scan 02/2023. Past Medical History:   Diagnosis Date    B12 deficiency     Iron deficiency     Osteoporosis        Past Surgical History:   Procedure Laterality Date    BREAST BIOPSY Right 1996    excisional biopsy , benign    US BREAST BIOPSY W LOC DEVICE 1ST LESION LEFT Left 2002    Benign       Current Outpatient Medications   Medication Sig Dispense Refill    denosumab (PROLIA) 60 MG/ML SOSY SC injection Inject into the skin      lisinopril (PRINIVIL;ZESTRIL) 10 MG tablet Take 1 tablet by mouth daily 90 tablet 1    mirtazapine (REMERON) 7.5 MG tablet Take 1 tablet by mouth nightly 90 tablet 1    busPIRone (BUSPAR) 5 MG tablet Take 1 tablet by mouth 3 times daily as needed (Anxiety) 30 tablet 0    buPROPion (WELLBUTRIN) 75 MG tablet Take 1 tablet by mouth daily 15 tablet 0    buPROPion (WELLBUTRIN) 75 MG tablet Take 1 tablet by mouth once      DULoxetine (CYMBALTA) 60 MG extended release capsule Cymbalta 60 mg capsule,delayed release   Take 1 capsule every day by oral route. vitamin D 1000 units CAPS Take 1 capsule by mouth      ferrous sulfate 325 (65 Fe) MG tablet Take 65 mg by mouth      Multiple Vitamins-Minerals (MULTIVITAMIN ADULT PO) multivitamin      Cyanocobalamin 1000 MCG/ML KIT vitamin B12-folic acid injection solution       No current facility-administered medications for this visit.        No Known Allergies    Social History     Socioeconomic History    Marital status: Single     Spouse name: Not on file    Number of children: Not on file    Years of education: Not on file    Highest education level: Not on file   Occupational History    Not on file   Tobacco Use    Smoking status: Never    Smokeless tobacco: Never   Vaping Use

## 2023-09-13 ENCOUNTER — OFFICE VISIT (OUTPATIENT)
Dept: PRIMARY CARE | Facility: CLINIC | Age: 63
End: 2023-09-13
Payer: COMMERCIAL

## 2023-09-13 VITALS
WEIGHT: 143 LBS | DIASTOLIC BLOOD PRESSURE: 90 MMHG | HEART RATE: 78 BPM | BODY MASS INDEX: 23.8 KG/M2 | OXYGEN SATURATION: 99 % | SYSTOLIC BLOOD PRESSURE: 148 MMHG

## 2023-09-13 DIAGNOSIS — Z78.0 MENOPAUSE: Primary | ICD-10-CM

## 2023-09-13 DIAGNOSIS — I10 PRIMARY HYPERTENSION: ICD-10-CM

## 2023-09-13 PROCEDURE — 99214 OFFICE O/P EST MOD 30 MIN: CPT | Performed by: FAMILY MEDICINE

## 2023-09-13 PROCEDURE — 1159F MED LIST DOCD IN RCRD: CPT | Performed by: FAMILY MEDICINE

## 2023-09-13 PROCEDURE — 3080F DIAST BP >= 90 MM HG: CPT | Performed by: FAMILY MEDICINE

## 2023-09-13 PROCEDURE — 3077F SYST BP >= 140 MM HG: CPT | Performed by: FAMILY MEDICINE

## 2023-09-13 PROCEDURE — 1160F RVW MEDS BY RX/DR IN RCRD: CPT | Performed by: FAMILY MEDICINE

## 2023-09-13 PROCEDURE — 1036F TOBACCO NON-USER: CPT | Performed by: FAMILY MEDICINE

## 2023-09-13 RX ORDER — MIRTAZAPINE 7.5 MG/1
1 TABLET, FILM COATED ORAL NIGHTLY
COMMUNITY
Start: 2023-09-07

## 2023-09-13 RX ORDER — BUSPIRONE HYDROCHLORIDE 5 MG/1
5 TABLET ORAL 3 TIMES DAILY
COMMUNITY
Start: 2023-09-07 | End: 2023-10-07

## 2023-09-13 RX ORDER — LISINOPRIL 10 MG/1
1 TABLET ORAL DAILY
COMMUNITY
Start: 2023-09-07

## 2023-09-13 ASSESSMENT — ENCOUNTER SYMPTOMS
DIZZINESS: 0
FACIAL ASYMMETRY: 0
BACK PAIN: 0
COLOR CHANGE: 0
FATIGUE: 0
PALPITATIONS: 0
ARTHRALGIAS: 0
APPETITE CHANGE: 0
ACTIVITY CHANGE: 0
FEVER: 0
HEADACHES: 0
CHEST TIGHTNESS: 0
CHOKING: 0
COUGH: 0
LIGHT-HEADEDNESS: 0

## 2023-09-13 NOTE — PROGRESS NOTES
Subjective   Patient ID: Panda Valdes is a 63 y.o. female who presents for Elevated BP readings.     HPI     She has recently been to the dentist and she was told that her bp's have been 150/80's.     Patient reports has had difficulty with some elevated blood pressure readings.  Denies any nausea vomit diarrhea constipation denies any chest pain shortness of breath syncopal episodes or palpitations.    Patient was seen in the urgent care and they did not start her on medication and told her that she needed to establish with a primary care locally in [facility].  Patient reports that she did and was started on lisinopril 10 mg daily.    She was taken off trazodone off wellbutrin and started on Buspar and remeron.     Review of Systems   Constitutional:  Negative for activity change, appetite change, fatigue and fever.   HENT:  Negative for congestion.    Respiratory:  Negative for cough, choking and chest tightness.    Cardiovascular:  Negative for chest pain, palpitations and leg swelling.   Musculoskeletal:  Negative for arthralgias, back pain and gait problem.   Skin:  Negative for color change and pallor.   Neurological:  Negative for dizziness, facial asymmetry, light-headedness and headaches.       Objective   /90 (BP Location: Right arm)   Pulse 78   Wt 64.9 kg (143 lb)   SpO2 99%   BMI 23.80 kg/m²   BSA Body surface area is 1.73 meters squared.      Physical Exam  Constitutional:       General: She is not in acute distress.     Appearance: Normal appearance. She is not toxic-appearing.   HENT:      Head: Normocephalic.      Right Ear: Tympanic membrane, ear canal and external ear normal.      Left Ear: Tympanic membrane, ear canal and external ear normal.   Eyes:      Conjunctiva/sclera: Conjunctivae normal.      Pupils: Pupils are equal, round, and reactive to light.   Cardiovascular:      Rate and Rhythm: Normal rate and regular rhythm.      Pulses: Normal pulses.      Heart sounds: Normal  "heart sounds.   Pulmonary:      Effort: No respiratory distress.      Breath sounds: No wheezing, rhonchi or rales.   Musculoskeletal:         General: No swelling or tenderness.   Skin:     Findings: No lesion or rash.   Neurological:      General: No focal deficit present.      Mental Status: She is alert and oriented to person, place, and time. Mental status is at baseline.      Gait: Gait normal.   Psychiatric:         Mood and Affect: Mood normal.         Behavior: Behavior normal.         Thought Content: Thought content normal.         Judgment: Judgment normal.       No visits with results within 1 Year(s) from this visit.   Latest known visit with results is:   No results found for any previous visit.     Current Outpatient Medications on File Prior to Visit   Medication Sig Dispense Refill    buPROPion SR (Wellbutrin SR) 150 mg 12 hr tablet Take 1 tablet (150 mg) by mouth once daily.      busPIRone (Buspar) 5 mg tablet Take 1 tablet (5 mg) by mouth 3 times a day.      cholecalciferol (Vitamin D-3) 50 mcg (2,000 unit) capsule Take 1 capsule (50 mcg) by mouth early in the morning..      cyanocobalamin (Vitamin B-12) 1,000 mcg/mL injection 0.5 mL (500 mcg) every 30 (thirty) days. INJECT 0.5 ML ONCE MONTHLY      denosumab (Prolia) 60 mg/mL syringe Inject under the skin. TO BE ADMINISTERED IN PHYSICIAN'S OFFICE. INJECT ONE SYRINGE SUBCOUSLY ONCE EVERY 6 MONTHS. REFRIGERATE. USE WITHIN 14 DAYS ONCE AT ROOM TEMPERATURE.      DULoxetine (Cymbalta) 60 mg DR capsule TAKE 1 CAPSULE BY MOUTH EVERY DAY 90 capsule 3    ferrous sulfate 325 (65 Fe) MG tablet Take 1 tablet (325 mg) by mouth once daily.      lisinopril 10 mg tablet Take 1 tablet (10 mg) by mouth once daily.      mirtazapine (Remeron) 7.5 mg tablet Take 1 tablet (7.5 mg) by mouth once daily at bedtime.      multivitamin tablet Take 1 tablet by mouth once daily. Multivital-M TABS      syringe with needle 1 mL 25 gauge x 5/8\" syringe Use syringe Q month with " B12 injections 12 each 0    traZODone (Desyrel) 50 mg tablet TAKE 1/2 TABLET BY MOUTH EVERY DAY AT BEDTIME (Patient not taking: Reported on 9/13/2023) 45 tablet 0     No current facility-administered medications on file prior to visit.     No images are attached to the encounter.            Assessment/Plan   Diagnoses and all orders for this visit:  Menopause  -     FSH; Future  -     Luteinizing hormone; Future  -     Transthoracic Echo (TTE) Complete; Future  -     Cortisol AM; Future  Primary hypertension  -     Transthoracic Echo (TTE) Complete; Future    Patient to continue dose of lisinopril  Patient is going to be following up with doctor close to her at this time  Patient should be having echocardiogram performed  Patient to call if questions or concerns

## 2023-09-18 DIAGNOSIS — F32.A DEPRESSION, UNSPECIFIED DEPRESSION TYPE: ICD-10-CM

## 2023-09-18 RX ORDER — BUPROPION HYDROCHLORIDE 75 MG/1
75 TABLET ORAL DAILY
Qty: 15 TABLET | Refills: 0 | Status: CANCELLED | OUTPATIENT
Start: 2023-09-18

## 2023-09-20 ENCOUNTER — NURSE ONLY (OUTPATIENT)
Dept: LAB | Age: 63
End: 2023-09-20

## 2023-09-20 DIAGNOSIS — D50.9 IRON DEFICIENCY ANEMIA, UNSPECIFIED IRON DEFICIENCY ANEMIA TYPE: ICD-10-CM

## 2023-09-20 DIAGNOSIS — E53.8 B12 DEFICIENCY: ICD-10-CM

## 2023-09-20 DIAGNOSIS — I10 HYPERTENSION, UNSPECIFIED TYPE: ICD-10-CM

## 2023-09-20 DIAGNOSIS — F32.A DEPRESSION, UNSPECIFIED DEPRESSION TYPE: ICD-10-CM

## 2023-09-20 LAB
25(OH)D3 SERPL-MCNC: 35 NG/ML (ref 30–100)
ALBUMIN SERPL BCG-MCNC: 4.4 G/DL (ref 3.5–5.1)
ALP SERPL-CCNC: 64 U/L (ref 38–126)
ALT SERPL W/O P-5'-P-CCNC: 18 U/L (ref 11–66)
ANION GAP SERPL CALC-SCNC: 12 MEQ/L (ref 8–16)
AST SERPL-CCNC: 17 U/L (ref 5–40)
BILIRUB SERPL-MCNC: 0.6 MG/DL (ref 0.3–1.2)
BUN SERPL-MCNC: 10 MG/DL (ref 7–22)
CALCIUM SERPL-MCNC: 9 MG/DL (ref 8.5–10.5)
CHLORIDE SERPL-SCNC: 107 MEQ/L (ref 98–111)
CO2 SERPL-SCNC: 24 MEQ/L (ref 23–33)
CREAT SERPL-MCNC: 0.6 MG/DL (ref 0.4–1.2)
DEPRECATED RDW RBC AUTO: 44.3 FL (ref 35–45)
ERYTHROCYTE [DISTWIDTH] IN BLOOD BY AUTOMATED COUNT: 12.1 % (ref 11.5–14.5)
FERRITIN SERPL IA-MCNC: 295 NG/ML (ref 10–291)
FOLATE SERPL-MCNC: > 20 NG/ML (ref 4.8–24.2)
GFR SERPL CREATININE-BSD FRML MDRD: > 60 ML/MIN/1.73M2
GLUCOSE SERPL-MCNC: 88 MG/DL (ref 70–108)
HCT VFR BLD AUTO: 41.3 % (ref 37–47)
HGB BLD-MCNC: 13.5 GM/DL (ref 12–16)
IRON SATN MFR SERPL: 38 % (ref 20–50)
IRON SERPL-MCNC: 115 UG/DL (ref 50–170)
MCH RBC QN AUTO: 32.4 PG (ref 26–33)
MCHC RBC AUTO-ENTMCNC: 32.7 GM/DL (ref 32.2–35.5)
MCV RBC AUTO: 99 FL (ref 81–99)
PLATELET # BLD AUTO: 295 THOU/MM3 (ref 130–400)
PMV BLD AUTO: 10.9 FL (ref 9.4–12.4)
POTASSIUM SERPL-SCNC: 4.5 MEQ/L (ref 3.5–5.2)
PROT SERPL-MCNC: 6.6 G/DL (ref 6.1–8)
RBC # BLD AUTO: 4.17 MILL/MM3 (ref 4.2–5.4)
SODIUM SERPL-SCNC: 143 MEQ/L (ref 135–145)
T4 FREE SERPL-MCNC: 0.81 NG/DL (ref 0.93–1.76)
TIBC SERPL-MCNC: 300 UG/DL (ref 171–450)
TSH SERPL DL<=0.005 MIU/L-ACNC: 1.29 UIU/ML (ref 0.4–4.2)
VIT B12 SERPL-MCNC: 804 PG/ML (ref 211–911)
WBC # BLD AUTO: 5.3 THOU/MM3 (ref 4.8–10.8)

## 2023-09-21 LAB
FOLLICLE STIMULATING HORMONE: 88.5 MIU/ML
LUTEINIZING HORMONE: 31.6 MIU/ML (ref 1.7–8.6)

## 2023-09-25 SDOH — HEALTH STABILITY: PHYSICAL HEALTH: ON AVERAGE, HOW MANY MINUTES DO YOU ENGAGE IN EXERCISE AT THIS LEVEL?: 30 MIN

## 2023-09-25 SDOH — HEALTH STABILITY: PHYSICAL HEALTH: ON AVERAGE, HOW MANY DAYS PER WEEK DO YOU ENGAGE IN MODERATE TO STRENUOUS EXERCISE (LIKE A BRISK WALK)?: 3 DAYS

## 2023-09-26 ENCOUNTER — OFFICE VISIT (OUTPATIENT)
Dept: FAMILY MEDICINE CLINIC | Age: 63
End: 2023-09-26
Payer: COMMERCIAL

## 2023-09-26 VITALS
DIASTOLIC BLOOD PRESSURE: 88 MMHG | SYSTOLIC BLOOD PRESSURE: 132 MMHG | HEART RATE: 74 BPM | OXYGEN SATURATION: 96 % | WEIGHT: 145.2 LBS | RESPIRATION RATE: 14 BRPM | BODY MASS INDEX: 23.33 KG/M2 | TEMPERATURE: 97.4 F | HEIGHT: 66 IN

## 2023-09-26 DIAGNOSIS — F41.1 GAD (GENERALIZED ANXIETY DISORDER): ICD-10-CM

## 2023-09-26 DIAGNOSIS — E78.00 HYPERCHOLESTEREMIA: ICD-10-CM

## 2023-09-26 DIAGNOSIS — I10 BENIGN ESSENTIAL HTN: ICD-10-CM

## 2023-09-26 DIAGNOSIS — R53.83 OTHER FATIGUE: Primary | ICD-10-CM

## 2023-09-26 PROBLEM — M81.0 OSTEOPOROSIS: Status: ACTIVE | Noted: 2023-03-23

## 2023-09-26 PROBLEM — R92.2 DENSE BREASTS: Status: ACTIVE | Noted: 2023-03-23

## 2023-09-26 PROBLEM — D64.9 ANEMIA: Status: ACTIVE | Noted: 2023-03-23

## 2023-09-26 PROBLEM — R92.30 DENSE BREASTS: Status: ACTIVE | Noted: 2023-03-23

## 2023-09-26 PROCEDURE — 93000 ELECTROCARDIOGRAM COMPLETE: CPT | Performed by: NURSE PRACTITIONER

## 2023-09-26 PROCEDURE — 99214 OFFICE O/P EST MOD 30 MIN: CPT | Performed by: NURSE PRACTITIONER

## 2023-09-26 PROCEDURE — 3078F DIAST BP <80 MM HG: CPT | Performed by: NURSE PRACTITIONER

## 2023-09-26 PROCEDURE — 3074F SYST BP LT 130 MM HG: CPT | Performed by: NURSE PRACTITIONER

## 2023-09-26 PROCEDURE — 1123F ACP DISCUSS/DSCN MKR DOCD: CPT | Performed by: NURSE PRACTITIONER

## 2023-09-26 RX ORDER — LISINOPRIL AND HYDROCHLOROTHIAZIDE 12.5; 1 MG/1; MG/1
1 TABLET ORAL DAILY
Qty: 30 TABLET | Refills: 3 | Status: SHIPPED | OUTPATIENT
Start: 2023-09-26

## 2023-09-26 NOTE — PROGRESS NOTES
Carmela [place] is a 70 y.o. female thatpresents for New Patient, Hypertension, and Discuss Labs      History obtained today from Patient. New pt to [facility] practice    HPI    Diet: cooks at home, drinks water  Exercise: goes for walks, tries to  Sleep: trouble staying asleep. Tried Trazodone in the past. Now on Remeron, feels it helps some nights. PMH: HTN, B12 deficiency, Iron Deficiency, hx of depression, osteoporosis  Surgical hx:breast biopsy  Social: non smoker, socially/wine 2-3 glasses a week  Concerns today: sleep, on Remeron now. Has appt with psychiatry next week to discuss medications. Depressive symptoms  Lack of motivation  Don't want to leave house  Irritable, fatigue  Used to see psychiatry in [facility]  Has been on Cymbalta and Wellbutrin for years. Recently weaned off of Wellbutrin  Having some side effects to Buspar, fatigue    Feels Elevated BP and feeling 'off' one week out of the month. Curious if this has something to do with hormones  Interested in estrogen replacement  Dr Julianna Caba. LASHANDA Sargent  Has a friend doing pellets and has found relief in some of her symptoms  Pt denies hot flashes or vaginal dryness        HTN  Newly diagnosed. BP elevated in ER and at dentist recently    Does patient check BP regularly at home? - Yes - 160s-140s various times of the day with arm cuff  Current Medication regimen - lisinopril  Tolerating medications well? - yes but making her fatigued    Shortness of breath or chest pain? No  Headache or visual complaints? No  Neurologic changes like confusion? No  Extremity edema?  No    BP Readings from Last 3 Encounters:   23 132/88   23 126/74   23 (!) 148/96         Low T4, normal TSH  Labs done by previous provider    +fatigue  No constipation  No changes in skin or brittle nails    Elevated Lipids  The 10-year ASCVD risk score (Nicol DK, et al., 2019) is: 8.1%        I have reviewed the patient's past medical history, past

## 2023-09-27 ASSESSMENT — ENCOUNTER SYMPTOMS
CHOKING: 0
SHORTNESS OF BREATH: 0
COUGH: 0

## 2023-09-28 ENCOUNTER — TELEPHONE (OUTPATIENT)
Dept: FAMILY MEDICINE CLINIC | Age: 63
End: 2023-09-28

## 2023-09-28 NOTE — TELEPHONE ENCOUNTER
----- Message from Mitchell Mcclain sent at 9/28/2023  2:09 PM EDT -----  Subject: Medication Problem    Medication: lisinopril-hydroCHLOROthiazide (PRINZIDE;ZESTORETIC) 10-12.5   MG per tablet  Dosage: 10-12.5 MG  Ordering Provider: Briana Sol    Question/Problem: Pt is wanting to know if she should take this and her   previous Lisinopril or just this one.        Pharmacy: [id number] [place] [phone]    ---------------------------------------------------------------------------  --------------  Arlene MIXON  [phone]; OK to leave message on voicemail  ---------------------------------------------------------------------------  --------------    SCRIPT ANSWERS  Relationship to Patient: Self

## 2023-10-05 ENCOUNTER — OFFICE VISIT (OUTPATIENT)
Dept: PSYCHIATRY | Age: 63
End: 2023-10-05
Payer: COMMERCIAL

## 2023-10-05 DIAGNOSIS — F43.9 STRESS AT HOME: ICD-10-CM

## 2023-10-05 DIAGNOSIS — F42.2 MIXED OBSESSIONAL THOUGHTS AND ACTS: ICD-10-CM

## 2023-10-05 DIAGNOSIS — G47.00 INSOMNIA, UNSPECIFIED TYPE: ICD-10-CM

## 2023-10-05 DIAGNOSIS — F41.1 GENERALIZED ANXIETY DISORDER: ICD-10-CM

## 2023-10-05 DIAGNOSIS — F34.1 PERSISTENT DEPRESSIVE DISORDER: Primary | ICD-10-CM

## 2023-10-05 PROCEDURE — 90792 PSYCH DIAG EVAL W/MED SRVCS: CPT

## 2023-10-05 RX ORDER — DULOXETIN HYDROCHLORIDE 60 MG/1
60 CAPSULE, DELAYED RELEASE ORAL DAILY
Qty: 30 CAPSULE | Refills: 0 | Status: SHIPPED | OUTPATIENT
Start: 2023-10-05 | End: 2023-11-04

## 2023-10-05 RX ORDER — CLOMIPRAMINE HYDROCHLORIDE 25 MG/1
25 CAPSULE ORAL NIGHTLY
Qty: 30 CAPSULE | Refills: 0 | Status: SHIPPED | OUTPATIENT
Start: 2023-10-05 | End: 2023-11-04

## 2023-10-05 ASSESSMENT — PATIENT HEALTH QUESTIONNAIRE - PHQ9
6. FEELING BAD ABOUT YOURSELF - OR THAT YOU ARE A FAILURE OR HAVE LET YOURSELF OR YOUR FAMILY DOWN: 1
9. THOUGHTS THAT YOU WOULD BE BETTER OFF DEAD, OR OF HURTING YOURSELF: 0
8. MOVING OR SPEAKING SO SLOWLY THAT OTHER PEOPLE COULD HAVE NOTICED. OR THE OPPOSITE, BEING SO FIGETY OR RESTLESS THAT YOU HAVE BEEN MOVING AROUND A LOT MORE THAN USUAL: 0
3. TROUBLE FALLING OR STAYING ASLEEP: 3
SUM OF ALL RESPONSES TO PHQ9 QUESTIONS 1 & 2: 4
4. FEELING TIRED OR HAVING LITTLE ENERGY: 3
2. FEELING DOWN, DEPRESSED OR HOPELESS: 2
5. POOR APPETITE OR OVEREATING: 2
SUM OF ALL RESPONSES TO PHQ QUESTIONS 1-9: 13
7. TROUBLE CONCENTRATING ON THINGS, SUCH AS READING THE NEWSPAPER OR WATCHING TELEVISION: 0
SUM OF ALL RESPONSES TO PHQ QUESTIONS 1-9: 13
10. IF YOU CHECKED OFF ANY PROBLEMS, HOW DIFFICULT HAVE THESE PROBLEMS MADE IT FOR YOU TO DO YOUR WORK, TAKE CARE OF THINGS AT HOME, OR GET ALONG WITH OTHER PEOPLE: 2
SUM OF ALL RESPONSES TO PHQ QUESTIONS 1-9: 13
1. LITTLE INTEREST OR PLEASURE IN DOING THINGS: 2
SUM OF ALL RESPONSES TO PHQ QUESTIONS 1-9: 13

## 2023-10-05 ASSESSMENT — ANXIETY QUESTIONNAIRES
GAD7 TOTAL SCORE: 14
6. BECOMING EASILY ANNOYED OR IRRITABLE: 2
3. WORRYING TOO MUCH ABOUT DIFFERENT THINGS: 3
5. BEING SO RESTLESS THAT IT IS HARD TO SIT STILL: 1
4. TROUBLE RELAXING: 1
1. FEELING NERVOUS, ANXIOUS, OR ON EDGE: 3
7. FEELING AFRAID AS IF SOMETHING AWFUL MIGHT HAPPEN: 2
IF YOU CHECKED OFF ANY PROBLEMS ON THIS QUESTIONNAIRE, HOW DIFFICULT HAVE THESE PROBLEMS MADE IT FOR YOU TO DO YOUR WORK, TAKE CARE OF THINGS AT HOME, OR GET ALONG WITH OTHER PEOPLE: EXTREMELY DIFFICULT
2. NOT BEING ABLE TO STOP OR CONTROL WORRYING: 2

## 2023-10-05 NOTE — PATIENT INSTRUCTIONS
-Please take medications as prescribed  -Refrain from alcohol or drug use  -Seek emergency help via the emergency and/or calling 911 should symptoms become severe, worsen, or with other concerning symptoms. Go immediately to the emergency room and/or call 911 with any suicidal or homicidal ideations or if audio/visual hallucinations develop.   -Contact office with any questions or concerns. Crisis phone numbers:  Spencer Zheng, and [facility] [phone]. Tarsha Martin and [address] [phone]. Jeff and [facility] [phone]. [address] [place] [address]. Vi Medina [phone].

## 2023-10-05 NOTE — PROGRESS NOTES
[address]  [place] [address]  Dept: [phone]  Dept Fax: [phone]  Loc: [phone]    Visit Date: 10/5/2023    SUBJECTIVE DATA     CHIEF COMPLAINT:    Chief Complaint   Patient presents with    Anxiety    Depression    New Patient       History obtained from: patient    HISTORY OF PRESENT ILLNESS:    Neli Mustafa is a 70 y.o. female who presents to the office for management of mood and anxiety. Referred by PCP who has been managing psychiatric medications. She was recently taken off Wellbutrin and started on Remeron for sleep disturbances. States she she has gained over 5lbs in the last 2 weeks following the initiation of Remeron. Denies other side effects from medications. Will be seeing Manoj Escobar for possible HRT  -States she is unsure if a \"hormonal imbalance\" is contributing to her mood/anxiety symptoms     HPI      Endorses Depression  -Patient endorses feeling sad, down and depressed most days  -Denies anhedonia  -Denies issues with Concentration  -Reports appetite has recently increased since starting Remeron   -Reports occasional feelings of guilt  -Denies feeling Hopeless or helpless  -Reports decreased Energy/Motivation daily   -Denies thoughts of harm to self or others   -Reports she first noticed depression around \"25-30  years ago, I think it has been there always but I just didn't know what it was. \"   -First treated for depression about 20-25 years ago  -Reports depression is \"always there. Sometimes it is worse than others. \"     Endorses sleep disturbances   -Endorses trouble initiating and maintaining, as well as difficulty falling back to sleep when awakening in the night   -Avg hours of sleep: 7-8 hours a night interrupted   -Reports she does not feel rested in the mornings  -Reports Remeron has not been beneficial in managing sleep disturbances   -Snores: denies   -Endorses being

## 2023-10-06 ENCOUNTER — TELEPHONE (OUTPATIENT)
Dept: PSYCHIATRY | Age: 63
End: 2023-10-06

## 2023-10-06 NOTE — TELEPHONE ENCOUNTER
Kathia Petersen called into the office stating that she was seen for her new patient appointment yesterday and she said that her Cymbalta was supposed to change but she picked up her prescription and its still for 60mg/once daily? She is unsure what the change was? Per chart; her note is not completed. Please advise.

## 2023-10-09 ENCOUNTER — OFFICE VISIT (OUTPATIENT)
Dept: FAMILY MEDICINE CLINIC | Age: 63
End: 2023-10-09
Payer: COMMERCIAL

## 2023-10-09 VITALS
DIASTOLIC BLOOD PRESSURE: 90 MMHG | RESPIRATION RATE: 14 BRPM | HEIGHT: 66 IN | HEART RATE: 77 BPM | BODY MASS INDEX: 23.33 KG/M2 | TEMPERATURE: 97.6 F | OXYGEN SATURATION: 96 % | SYSTOLIC BLOOD PRESSURE: 138 MMHG | WEIGHT: 145.2 LBS

## 2023-10-09 DIAGNOSIS — I10 HYPERTENSION, UNSPECIFIED TYPE: ICD-10-CM

## 2023-10-09 DIAGNOSIS — F41.1 GAD (GENERALIZED ANXIETY DISORDER): Primary | ICD-10-CM

## 2023-10-09 PROBLEM — M81.0 AGE-RELATED OSTEOPOROSIS WITHOUT CURRENT PATHOLOGICAL FRACTURE: Status: ACTIVE | Noted: 2023-03-23

## 2023-10-09 PROCEDURE — 99214 OFFICE O/P EST MOD 30 MIN: CPT | Performed by: NURSE PRACTITIONER

## 2023-10-09 PROCEDURE — 3075F SYST BP GE 130 - 139MM HG: CPT | Performed by: NURSE PRACTITIONER

## 2023-10-09 PROCEDURE — 3079F DIAST BP 80-89 MM HG: CPT | Performed by: NURSE PRACTITIONER

## 2023-10-09 PROCEDURE — 1123F ACP DISCUSS/DSCN MKR DOCD: CPT | Performed by: NURSE PRACTITIONER

## 2023-10-09 RX ORDER — LISINOPRIL 20 MG/1
20 TABLET ORAL DAILY
Qty: 30 TABLET | Refills: 5
Start: 2023-10-09

## 2023-10-09 NOTE — TELEPHONE ENCOUNTER
The dose of her Cymbalta was not changing. Anafranil was started at [facility] for sleep and this will also help with anxiety/OCD.

## 2023-10-10 ENCOUNTER — TELEPHONE (OUTPATIENT)
Dept: PSYCHIATRY | Age: 63
End: 2023-10-10

## 2023-10-10 NOTE — TELEPHONE ENCOUNTER
Davi Zhang wrote into the office via [employer]:    She labeled the message Clomipramine-    Hello,     After taking this medication for two nights, the next day I felt really tired and dizzy, so i stopped taking it. thank you. Nora Krueger    She is scheduled to return on 11/09/23.

## 2023-10-10 NOTE — TELEPHONE ENCOUNTER
Does she want to increase Cymbalta for management of anxiety, or trial another medication for sleep?

## 2023-10-10 NOTE — TELEPHONE ENCOUNTER
I have wrote back to [facility] with this information via [employer]; awaiting response.

## 2023-11-07 ENCOUNTER — OFFICE VISIT (OUTPATIENT)
Dept: FAMILY MEDICINE CLINIC | Age: 63
End: 2023-11-07
Payer: COMMERCIAL

## 2023-11-07 VITALS
HEIGHT: 66 IN | SYSTOLIC BLOOD PRESSURE: 132 MMHG | DIASTOLIC BLOOD PRESSURE: 84 MMHG | BODY MASS INDEX: 23.82 KG/M2 | RESPIRATION RATE: 14 BRPM | OXYGEN SATURATION: 97 % | HEART RATE: 64 BPM | TEMPERATURE: 97.3 F | WEIGHT: 148.2 LBS

## 2023-11-07 DIAGNOSIS — I10 BENIGN ESSENTIAL HTN: ICD-10-CM

## 2023-11-07 DIAGNOSIS — M65.4 DE QUERVAIN'S DISEASE (TENOSYNOVITIS): Primary | ICD-10-CM

## 2023-11-07 PROCEDURE — 3075F SYST BP GE 130 - 139MM HG: CPT | Performed by: NURSE PRACTITIONER

## 2023-11-07 PROCEDURE — 99214 OFFICE O/P EST MOD 30 MIN: CPT | Performed by: NURSE PRACTITIONER

## 2023-11-07 PROCEDURE — 1123F ACP DISCUSS/DSCN MKR DOCD: CPT | Performed by: NURSE PRACTITIONER

## 2023-11-07 PROCEDURE — 3079F DIAST BP 80-89 MM HG: CPT | Performed by: NURSE PRACTITIONER

## 2023-11-07 RX ORDER — PREDNISONE 20 MG/1
40 TABLET ORAL DAILY
Qty: 10 TABLET | Refills: 0 | Status: SHIPPED | OUTPATIENT
Start: 2023-11-07 | End: 2023-11-12

## 2023-11-07 NOTE — PATIENT INSTRUCTIONS
[facility]  [facility]  [address] [place] [address] [address] [facility].  [facility], [address] [place] [address] counseling services  PHONE: [phone]    [facility] [address]  [phone]

## 2023-11-07 NOTE — PROGRESS NOTES
Brinda Bonner is a 70 y.o. female thatpresents for 1 Month Follow-Up and Anxiety      History obtained today from Patient. HPI  HTN     Does patient check BP regularly at home? - Yes 130s-140s/80-90s  Current Medication regimen - lisinopril 20 mg daily  Tolerating medications well? - she thinks so  She has started other new medication as well so she is unsure which one is causing side effects. Shortness of breath or chest pain? No  Headache or visual complaints? NO   Neurologic changes like confusion? No  Extremity edema? No         BP Readings from Last 3 Encounters:   11-7-2023 132/84   09/26/23 132/88   09/21/23 126/74         Has appt with Charanjit William after she gets blood work, scheduled for 11-    Follows with Psychiatry at [facility]), scheduled 11/27/2023  Seeing Counselor, but retiring looking for a different provider    Difficulty sleeping   Chronic  Tried on Remeron and Trazodone  Buspar she takes during the day and this causes drowsiness, discussed taking it at night to see if it will help with sleep    Anxiety  Taking Cymbalta currently    Left Wrist pain  Swollen  For last few weeks  No trauma/injury  Swollen  Sore  Difficulty with ROM  No numbness or tingling   Advil helps for a little bit      Dexa 2023, diagnosed with osteoporosis 2021, on Prolia  Mammogram 2023      I have reviewed the patient's past medical history, past surgical history, allergies,medications, social and family history and I have made updates where appropriate.     Past Medical History:   Diagnosis Date    Anxiety     B12 deficiency     Depression     Hypertension     Iron deficiency     Osteoporosis        Social History     Tobacco Use    Smoking status: Never    Smokeless tobacco: Never   Vaping Use    Vaping Use: Never used   Substance Use Topics    Alcohol use: Yes     Comment: Occasionally 3 glasses, Denies issues with binge drinking or alcohol abuse    Drug use: Yes     Comment: gummies on occasion

## 2023-11-15 DIAGNOSIS — I10 HYPERTENSION, UNSPECIFIED TYPE: ICD-10-CM

## 2023-11-15 RX ORDER — LISINOPRIL 20 MG/1
20 TABLET ORAL DAILY
Qty: 30 TABLET | Refills: 5 | Status: SHIPPED | OUTPATIENT
Start: 2023-11-15

## 2023-11-21 ENCOUNTER — TELEPHONE (OUTPATIENT)
Dept: PRIMARY CARE | Facility: CLINIC | Age: 63
End: 2023-11-21
Payer: COMMERCIAL

## 2023-11-21 DIAGNOSIS — M81.0 AGE-RELATED OSTEOPOROSIS WITHOUT CURRENT PATHOLOGICAL FRACTURE: ICD-10-CM

## 2023-11-21 RX ORDER — DENOSUMAB 60 MG/ML
60 INJECTION SUBCUTANEOUS ONCE
Qty: 1 ML | Refills: 0 | Status: SHIPPED | OUTPATIENT
Start: 2023-11-21 | End: 2023-11-21

## 2023-11-27 ENCOUNTER — OFFICE VISIT (OUTPATIENT)
Dept: PSYCHIATRY | Age: 63
End: 2023-11-27
Payer: COMMERCIAL

## 2023-11-27 DIAGNOSIS — G47.00 INSOMNIA, UNSPECIFIED TYPE: ICD-10-CM

## 2023-11-27 DIAGNOSIS — F34.1 PERSISTENT DEPRESSIVE DISORDER: Primary | ICD-10-CM

## 2023-11-27 DIAGNOSIS — F41.1 GENERALIZED ANXIETY DISORDER: ICD-10-CM

## 2023-11-27 PROCEDURE — 1123F ACP DISCUSS/DSCN MKR DOCD: CPT

## 2023-11-27 PROCEDURE — 99214 OFFICE O/P EST MOD 30 MIN: CPT

## 2023-11-27 RX ORDER — LEVOTHYROXINE, LIOTHYRONINE 19; 4.5 UG/1; UG/1
30 TABLET ORAL DAILY
COMMUNITY
Start: 2023-11-22

## 2023-11-27 RX ORDER — DULOXETIN HYDROCHLORIDE 60 MG/1
60 CAPSULE, DELAYED RELEASE ORAL DAILY
Qty: 30 CAPSULE | Refills: 1 | Status: SHIPPED | OUTPATIENT
Start: 2023-11-27 | End: 2024-01-26

## 2023-11-27 NOTE — PATIENT INSTRUCTIONS
-Please take medications as prescribed  -Refrain from alcohol or drug use  -Seek emergency help via the emergency and/or calling 911 should symptoms become severe, worsen, or with other concerning symptoms. Go immediately to the emergency room and/or call 911 with any suicidal or homicidal ideations or if audio/visual hallucinations develop.   -Contact office with any questions or concerns. Crisis phone numbers:  Ida Veliz, and .[facility] [phone]. Darren Mcleod and [place] [address] [facility] [phone]. [facility] [phone]. [address] [place] [address]. Vi Baptiste [phone].

## 2023-11-27 NOTE — PROGRESS NOTES
Comment: gummies on occasion    Sexual activity: Yes   Other Topics Concern    Not on file   Social History Narrative    Not on file     Social Determinants of Health     Financial Resource Strain: Low Risk  (9/7/2023)    Overall Financial Resource Strain (CARDIA)     Difficulty of Paying Living Expenses: Not hard at all   Food Insecurity: Not on file (9/7/2023)   Transportation Needs: Unknown (9/7/2023)    PRAPARE - Transportation     Lack of Transportation (Medical): Not on file     Lack of Transportation (Non-Medical):  No   Physical Activity: Insufficiently Active (9/25/2023)    Exercise Vital Sign     Days of Exercise per Week: 3 days     Minutes of Exercise per Session: 30 min   Stress: Not on file   Social Connections: Not on file   Intimate Partner Violence: Not At Risk (9/25/2023)    Humiliation, Afraid, Rape, and Kick questionnaire     Fear of Current or Ex-Partner: No     Emotionally Abused: No     Physically Abused: No     Sexually Abused: No   Housing Stability: Unknown (9/7/2023)    Housing Stability Vital Sign     Unable to Pay for Housing in the Last Year: Not on file     Number of [address] in the Last Year: Not on file     Unstable Housing in the Last Year: No       PAST MEDICAL HISTORY:    Past Medical History:   Diagnosis Date    Anxiety     B12 deficiency     Depression     Hypertension     Iron deficiency     Osteoporosis        PAST SURGICAL HISTORY:    Past Surgical History:   Procedure Laterality Date    BREAST BIOPSY Right 1996    excisional biopsy , benign    US BREAST BIOPSY W LOC DEVICE 1ST LESION LEFT Left 2002    Benign       PREVIOUSMEDICATIONS:  Outpatient Medications Prior to Visit   Medication Sig Dispense Refill    NP THYROID 30 MG tablet Take 1 tablet by mouth daily      lisinopril (PRINIVIL;ZESTRIL) 20 MG tablet Take 1 tablet by mouth daily 30 tablet 5    denosumab (PROLIA) 60 MG/ML SOSY SC injection Inject into the skin      vitamin D 1000 units CAPS Take 1 capsule by mouth

## 2023-12-04 PROBLEM — G47.00 INSOMNIA: Status: ACTIVE | Noted: 2023-12-04

## 2023-12-04 PROBLEM — F34.1 PERSISTENT DEPRESSIVE DISORDER: Status: ACTIVE | Noted: 2023-12-04

## 2023-12-04 PROBLEM — F41.1 GENERALIZED ANXIETY DISORDER: Status: ACTIVE | Noted: 2023-12-04

## 2023-12-12 ENCOUNTER — TELEPHONE (OUTPATIENT)
Dept: FAMILY MEDICINE CLINIC | Age: 63
End: 2023-12-12

## 2023-12-12 NOTE — TELEPHONE ENCOUNTER
Pt would like to restart the wellbutrin 150 BID b/c she's feeling depresed  She is on cymbalta   It was stopped d/t possible reason for b/p increasing  She is now on lisinopril    Stopped hospital d/c Oxybutynin Pregnancy And Lactation Text: This medication is Pregnancy Category B and is considered safe during pregnancy. It is unknown if it is excreted in breast milk.

## 2023-12-13 NOTE — TELEPHONE ENCOUNTER
We can trial going back on Wellbutrin once a day and then increase if needed and BP controlled  Does she need a refill on it?

## 2023-12-18 ENCOUNTER — APPOINTMENT (OUTPATIENT)
Dept: PRIMARY CARE | Facility: CLINIC | Age: 63
End: 2023-12-18
Payer: COMMERCIAL

## 2023-12-18 ENCOUNTER — CLINICAL SUPPORT (OUTPATIENT)
Dept: PRIMARY CARE | Facility: CLINIC | Age: 63
End: 2023-12-18
Payer: COMMERCIAL

## 2023-12-18 DIAGNOSIS — M81.0 OSTEOPOROSIS, UNSPECIFIED OSTEOPOROSIS TYPE, UNSPECIFIED PATHOLOGICAL FRACTURE PRESENCE: ICD-10-CM

## 2023-12-18 PROCEDURE — 96372 THER/PROPH/DIAG INJ SC/IM: CPT | Performed by: FAMILY MEDICINE

## 2023-12-21 ENCOUNTER — HOSPITAL ENCOUNTER (EMERGENCY)
Age: 63
Discharge: HOME | End: 2023-12-21
Payer: COMMERCIAL

## 2023-12-21 VITALS
HEART RATE: 79 BPM | SYSTOLIC BLOOD PRESSURE: 131 MMHG | BODY MASS INDEX: 22.94 KG/M2 | OXYGEN SATURATION: 97 % | DIASTOLIC BLOOD PRESSURE: 84 MMHG | WEIGHT: 140 LBS | TEMPERATURE: 97.4 F | RESPIRATION RATE: 20 BRPM

## 2023-12-21 DIAGNOSIS — E53.8 DEFICIENCY OF OTHER SPECIFIED B GROUP VITAMINS: ICD-10-CM

## 2023-12-21 DIAGNOSIS — U07.1 COVID-19: Primary | ICD-10-CM

## 2023-12-21 LAB — SARS-COV-2 RDRP RESP QL NAA+PROBE: DETECTED

## 2023-12-21 PROCEDURE — 99213 OFFICE O/P EST LOW 20 MIN: CPT | Performed by: NURSE PRACTITIONER

## 2023-12-21 PROCEDURE — 87635 SARS-COV-2 COVID-19 AMP PRB: CPT

## 2023-12-21 PROCEDURE — 99213 OFFICE O/P EST LOW 20 MIN: CPT

## 2023-12-21 RX ORDER — BENZONATATE 200 MG/1
200 CAPSULE ORAL 3 TIMES DAILY PRN
Qty: 30 CAPSULE | Refills: 0 | Status: SHIPPED | OUTPATIENT
Start: 2023-12-21 | End: 2023-12-28

## 2023-12-21 RX ORDER — CYANOCOBALAMIN 1000 UG/ML
INJECTION, SOLUTION INTRAMUSCULAR; SUBCUTANEOUS
Qty: 3 ML | Refills: 3 | Status: SHIPPED | OUTPATIENT
Start: 2023-12-21

## 2023-12-21 ASSESSMENT — PAIN - FUNCTIONAL ASSESSMENT: PAIN_FUNCTIONAL_ASSESSMENT: NONE - DENIES PAIN

## 2023-12-21 NOTE — ED TRIAGE NOTES
Patient to room with c/o chills, sore throat, generalized body aches, fatigue, and nonproductive cough beginning two days ago. Requests COVID testing. Swab obtained.

## 2023-12-21 NOTE — ED PROVIDER NOTES
[address]  Urgent Care Encounter       CHIEF COMPLAINT       Chief Complaint   Patient presents with    Cough     Chills, sore throat, fever       Nurses Notes reviewed and I agree except as noted in the HPI. HISTORY OF PRESENT ILLNESS   Malachi Gaytan is a 70 y.o. female who presents to the [facility] urgent care for evaluation of cough. She reports her symptoms started roughly 2 to 3 days ago. She reports congestion, rhinorrhea, postnasal drainage, scratchy throat, cough, fever, and chills. Denies known exposure to someone ill. Denies nausea, vomiting or diarrhea. The history is provided by the patient. No  was used. REVIEW OF SYSTEMS     Review of Systems   Constitutional:  Positive for chills, fatigue and fever. Negative for activity change and appetite change. HENT:  Positive for congestion, postnasal drip, rhinorrhea and sore throat. Negative for ear discharge and ear pain. Respiratory:  Positive for cough. Negative for chest tightness and shortness of breath. Cardiovascular:  Negative for chest pain. Gastrointestinal:  Negative for diarrhea, nausea and vomiting. Genitourinary:  Negative for dysuria. Musculoskeletal:  Positive for myalgias. Skin:  Negative for rash. Allergic/Immunologic: Negative for environmental allergies and food allergies. Neurological:  Negative for dizziness and headaches. PAST MEDICAL HISTORY         Diagnosis Date    Anxiety     B12 deficiency     Depression     Hypertension     Iron deficiency     Osteoporosis        SURGICALHISTORY     Patient  has a past surgical history that includes Breast biopsy (Right, 1996) and US BREAST BIOPSY W LOC DEVICE 1ST LESION LEFT (Left, 2002).     CURRENT MEDICATIONS       Discharge Medication List as of 12/21/2023  2:13 PM        CONTINUE these medications which have NOT CHANGED    Details   NP THYROID 30 MG tablet Take 1 tablet by mouth daily, DAWHistorical Med

## 2024-01-08 ENCOUNTER — TELEPHONE (OUTPATIENT)
Dept: FAMILY MEDICINE CLINIC | Age: 64
End: 2024-01-08

## 2024-01-08 NOTE — TELEPHONE ENCOUNTER
Pt called stating she was at [facility] for an appointment for her wrist. Pt had waited over an hour and was not seen. Pt had to leave and go back to work.  Pt was given information on [facility]. Pt states she will think about what she is wanting to do and call the office when she decides.

## 2024-01-22 ENCOUNTER — OFFICE VISIT (OUTPATIENT)
Dept: PSYCHIATRY | Age: 64
End: 2024-01-22
Payer: COMMERCIAL

## 2024-01-22 DIAGNOSIS — F43.9 STRESS AT HOME: ICD-10-CM

## 2024-01-22 DIAGNOSIS — F34.1 PERSISTENT DEPRESSIVE DISORDER: Primary | ICD-10-CM

## 2024-01-22 DIAGNOSIS — F41.1 GENERALIZED ANXIETY DISORDER: ICD-10-CM

## 2024-01-22 PROCEDURE — 1123F ACP DISCUSS/DSCN MKR DOCD: CPT

## 2024-01-22 PROCEDURE — 90833 PSYTX W PT W E/M 30 MIN: CPT

## 2024-01-22 PROCEDURE — 99214 OFFICE O/P EST MOD 30 MIN: CPT

## 2024-01-22 RX ORDER — BUPROPION HYDROCHLORIDE 150 MG/1
150 TABLET ORAL EVERY MORNING
Qty: 30 TABLET | Refills: 0 | Status: SHIPPED | OUTPATIENT
Start: 2024-01-22 | End: 2024-02-21

## 2024-01-22 RX ORDER — BUPROPION HYDROCHLORIDE 150 MG/1
150 TABLET ORAL EVERY MORNING
COMMUNITY
End: 2024-01-22 | Stop reason: SDUPTHER

## 2024-01-22 RX ORDER — PROGESTERONE 100 MG/1
100 CAPSULE ORAL NIGHTLY
COMMUNITY
Start: 2024-01-09

## 2024-01-22 RX ORDER — BUPROPION HYDROCHLORIDE 150 MG/1
150 TABLET, EXTENDED RELEASE ORAL 2 TIMES DAILY
COMMUNITY
End: 2024-01-22

## 2024-01-22 RX ORDER — DULOXETIN HYDROCHLORIDE 60 MG/1
60 CAPSULE, DELAYED RELEASE ORAL DAILY
Qty: 30 CAPSULE | Refills: 0 | Status: SHIPPED | OUTPATIENT
Start: 2024-01-22 | End: 2024-02-21

## 2024-01-22 RX ORDER — DULOXETIN HYDROCHLORIDE 30 MG/1
30 CAPSULE, DELAYED RELEASE ORAL DAILY
Qty: 30 CAPSULE | Refills: 0 | Status: SHIPPED | OUTPATIENT
Start: 2024-01-22

## 2024-01-22 RX ORDER — LEVOTHYROXINE, LIOTHYRONINE 38; 9 UG/1; UG/1
60 TABLET ORAL DAILY
COMMUNITY
Start: 2024-01-17

## 2024-01-22 NOTE — PROGRESS NOTES
[facility] [place] [facility]  [facility]  [address]  [facility] [address]  Dept: [phone]  Dept Fax: [phone]  Loc: [phone]    Visit Date: 1/22/2024    SUBJECTIVE DATA     CHIEF COMPLAINT:    Chief Complaint   Patient presents with    Depression    Anxiety    Follow-up       History obtained from: patient    HISTORY OF PRESENT ILLNESS:    Easton Montoya is a 64 y.o. female who presents for follow up for management of mood and anxiety.  Reports medication compliance,  denies side effects.   States PCP has restarted her on Wellbutrin and she is taking (2) 150 mg tablets in the am vs BID as rx  -She is asking about restarting Remeron or Buspar, states \"isn't there something you can give me to make me happy or take the edge off.\"     HPI      Endorses depression   -Patient endorses feeling sad, down and depressed most days   -Denies anhedonia  -Denies issues with Concentration  -Reports appetite is stable   -Reports occasional feelings of guilt  -Denies feeling Hopeless or helpless  -Reports decreased Energy/Motivation   -Denies thoughts of harm to self or others     Endorses sleep disturbances   -Denies trouble initiating or maintaining sleep  -Avg hours of sleep: 12 hours average   -Reports she does not feel rested in the mornings  -Snores: denies   -Reports she attempts to maintain a normal sleep routine    Endorses Anxiety  -Reports worrying and trouble controlling worry   -Reports feeling nervous and on edge for no apparent reason \"sometimes\"   -Reports she can be easily irritated and annoyed \"sometimes\"   -Denies feeling restless or trouble relaxing     Denies hallucinations, delusions, homicidal ideations or suicidal ideations.     Support -      She is from [place], [place] which is where her family and friends reside  -States \"I feel happy when I am there visiting but then I come back home and I am depressed again.\"     She endorses obsessions

## 2024-01-22 NOTE — PATIENT INSTRUCTIONS
-Please take medications as prescribed  -Refrain from alcohol or drug use  -Seek emergency help via the emergency and/or calling 911 should symptoms become severe, worsen, or with other concerning symptoms.Go immediately to the emergency room and/or call 911 with any suicidal or homicidal ideations or if audio/visual hallucinations develop.   -Contact office with any questions or concerns.     Crisis phone numbers:  [facility] [phone].  [facility] [phone]  [facility] [phone].  [facility] [phone].  [facility] [phone].  [facility] [phone].

## 2024-02-14 ENCOUNTER — HOSPITAL ENCOUNTER (EMERGENCY)
Age: 64
Discharge: HOME | End: 2024-02-14
Payer: COMMERCIAL

## 2024-02-14 VITALS
OXYGEN SATURATION: 96 % | SYSTOLIC BLOOD PRESSURE: 127 MMHG | DIASTOLIC BLOOD PRESSURE: 84 MMHG | TEMPERATURE: 98.4 F | HEART RATE: 69 BPM | BODY MASS INDEX: 22.94 KG/M2 | WEIGHT: 140 LBS | RESPIRATION RATE: 16 BRPM

## 2024-02-14 DIAGNOSIS — J06.9 UPPER RESPIRATORY TRACT INFECTION, UNSPECIFIED TYPE: Primary | ICD-10-CM

## 2024-02-14 LAB
FLUAV AG SPEC QL: NEGATIVE
FLUBV AG SPEC QL: NEGATIVE
S PYO AG THROAT QL: NEGATIVE

## 2024-02-14 PROCEDURE — 87804 INFLUENZA ASSAY W/OPTIC: CPT

## 2024-02-14 PROCEDURE — 99214 OFFICE O/P EST MOD 30 MIN: CPT

## 2024-02-14 PROCEDURE — 87651 STREP A DNA AMP PROBE: CPT

## 2024-02-14 PROCEDURE — 99213 OFFICE O/P EST LOW 20 MIN: CPT

## 2024-02-14 RX ORDER — PREDNISONE 20 MG/1
20 TABLET ORAL 2 TIMES DAILY
Qty: 10 TABLET | Refills: 0 | Status: SHIPPED | OUTPATIENT
Start: 2024-02-14 | End: 2024-02-19

## 2024-02-14 RX ORDER — AZITHROMYCIN 250 MG/1
250 TABLET, FILM COATED ORAL SEE ADMIN INSTRUCTIONS
Qty: 6 TABLET | Refills: 0 | Status: SHIPPED | OUTPATIENT
Start: 2024-02-14 | End: 2024-02-19

## 2024-02-14 ASSESSMENT — PAIN - FUNCTIONAL ASSESSMENT: PAIN_FUNCTIONAL_ASSESSMENT: NONE - DENIES PAIN

## 2024-02-14 NOTE — ED PROVIDER NOTES
[facility] URGENT CARE  Urgent Care Encounter      CHIEF COMPLAINT       Chief Complaint   Patient presents with    Fever    Cough    Nasal Congestion    Sweats       Nurses Notes reviewed and I agree except as noted in the HPI.  HISTORY OF PRESENT ILLNESS   Easton Montoya is a 64 y.o. female who presents to urgent care with complaints of fever, cough, congestion, sore throat, body aches.  Patient reports symptoms have been ongoing for 4 days.  Patient denies chest pain, shortness of breath, abdominal pain.  Patient reports she has been taking some over-the-counter medications without relief.  Patient reports she feels as if her cough is worsening.  Patient denies being around anyone sick recently that she is aware of.    REVIEW OF SYSTEMS     Review of Systems   Constitutional:  Positive for chills, diaphoresis and fever.   HENT:  Positive for congestion and sore throat. Negative for ear pain, facial swelling, sinus pressure and sinus pain.    Respiratory:  Positive for cough. Negative for shortness of breath and wheezing.    Cardiovascular:  Negative for chest pain.   Gastrointestinal:  Negative for abdominal pain, diarrhea and nausea.   Musculoskeletal:  Positive for arthralgias.   Neurological:  Negative for dizziness, seizures and headaches.       PAST MEDICAL HISTORY         Diagnosis Date    Anxiety     B12 deficiency     Depression     Hypertension     Iron deficiency     Osteoporosis        SURGICAL HISTORY     Patient  has a past surgical history that includes Breast biopsy (Right, 1996) and US BREAST BIOPSY W LOC DEVICE 1ST LESION LEFT (Left, 2002).    CURRENT MEDICATIONS       Discharge Medication List as of 2/14/2024  4:38 PM        CONTINUE these medications which have NOT CHANGED    Details   progesterone (PROMETRIUM) 100 MG CAPS capsule Take 1 capsule by mouth nightlyHistorical Med      NP THYROID 60 MG tablet Take 1 tablet by mouth daily, DAWHistorical Med      !! DULoxetine      Vitals:    02/14/24 1602   BP: 127/84   Pulse: 69   Resp: 16   Temp: 98.4 °F (36.9 °C)   TempSrc: Temporal   SpO2: 96%   Weight: 63.5 kg (140 lb)       Medications - No data to display  PROCEDURES:  None  FINAL IMPRESSION      1. Upper respiratory tract infection, unspecified type        DISPOSITION/PLAN   DISPOSITION Decision To Discharge 02/14/2024 04:37:44 PM    Rapid strep and influenza both negative at this time.  Discussed with patient I going to place her on antibiotics and steroids for acute upper respiratory infection.  Discussed with patient she may continue over-the-counter decongestants as well as Tylenol and ibuprofen for fevers and bodyaches.  Discussed with patient sinus rinses may also be helpful to her symptoms or Vicks vapor rub.  Discussed with patient if symptoms persist and do not improve over the next 3 to 5 days to follow-up with her primary care provider.  Discussed with patient if she develops shortness of breath or chest pain to go to the nearest emergency room.  Patient is in agreement with this plan.    PATIENT REFERRED TO:  Jenae Geronimo, LASHANDA - CNP  [address] [place] [address]  [phone]    Schedule an appointment as soon as possible for a visit   As needed    DISCHARGE MEDICATIONS:  Discharge Medication List as of 2/14/2024  4:38 PM        START taking these medications    Details   predniSONE (DELTASONE) 20 MG tablet Take 1 tablet by mouth 2 times daily for 5 days, Disp-10 tablet, R-0Normal      azithromycin (ZITHROMAX) 250 MG tablet Take 1 tablet by mouth See Admin Instructions for 5 days 500mg on day 1 followed by 250mg on days 2 - 5, Disp-6 tablet, R-0Normal           Discharge Medication List as of 2/14/2024  4:38 PM          LASHANDA Weber CNP, Alecksa N, APRN - CNP  02/14/24 2009

## 2024-02-19 ENCOUNTER — TELEPHONE (OUTPATIENT)
Dept: FAMILY MEDICINE CLINIC | Age: 64
End: 2024-02-19

## 2024-02-20 ENCOUNTER — OFFICE VISIT (OUTPATIENT)
Dept: FAMILY MEDICINE CLINIC | Age: 64
End: 2024-02-20
Payer: COMMERCIAL

## 2024-02-20 VITALS
TEMPERATURE: 97.6 F | WEIGHT: 143 LBS | RESPIRATION RATE: 14 BRPM | SYSTOLIC BLOOD PRESSURE: 124 MMHG | OXYGEN SATURATION: 97 % | HEIGHT: 65 IN | DIASTOLIC BLOOD PRESSURE: 70 MMHG | HEART RATE: 72 BPM | BODY MASS INDEX: 23.82 KG/M2

## 2024-02-20 DIAGNOSIS — M25.532 LEFT WRIST PAIN: ICD-10-CM

## 2024-02-20 DIAGNOSIS — F33.0 MILD EPISODE OF RECURRENT MAJOR DEPRESSIVE DISORDER (HCC): ICD-10-CM

## 2024-02-20 DIAGNOSIS — I10 HYPERTENSION, UNSPECIFIED TYPE: ICD-10-CM

## 2024-02-20 DIAGNOSIS — Z12.31 ENCOUNTER FOR SCREENING MAMMOGRAM FOR MALIGNANT NEOPLASM OF BREAST: Primary | ICD-10-CM

## 2024-02-20 DIAGNOSIS — F41.1 GAD (GENERALIZED ANXIETY DISORDER): ICD-10-CM

## 2024-02-20 PROCEDURE — 3074F SYST BP LT 130 MM HG: CPT | Performed by: NURSE PRACTITIONER

## 2024-02-20 PROCEDURE — 1123F ACP DISCUSS/DSCN MKR DOCD: CPT | Performed by: NURSE PRACTITIONER

## 2024-02-20 PROCEDURE — 99214 OFFICE O/P EST MOD 30 MIN: CPT | Performed by: NURSE PRACTITIONER

## 2024-02-20 PROCEDURE — 3078F DIAST BP <80 MM HG: CPT | Performed by: NURSE PRACTITIONER

## 2024-02-20 RX ORDER — LISINOPRIL 20 MG/1
20 TABLET ORAL DAILY
Qty: 90 TABLET | Refills: 3 | Status: SHIPPED | OUTPATIENT
Start: 2024-02-20

## 2024-02-20 NOTE — PROGRESS NOTES
Easton Montoya is a 64 y.o. female thatpresents for 3 Month Follow-Up      History obtained today from Patient.    HPI    HTN  On Lisinopril 20 mg daily  Tolerating well   No headaches  Had blood work done with Savita PYLE and Kidney function tests are normal  Well send copies to me      Depression  Seeing psychiatry  Prescribed Cymbalta 90 mg, only sticking with 60 mg capsule currently she isn't doing the 90 mg   Wellbutrin 150 mg XL and adding Wellbutrin 150 SR in afternoon as she doesn't think its lasting through the day  Progesterone is helping her sleep better     Left Wrist  DeQuervain's waited an hour and half to be seen and left    Pain has resolved since then     Osteoporosis  Dexa 2023, diagnosed with osteoporosis 2021, was on Prolia, last one 11/2023  Still following with [facility] in [place]      Mammogram 2023- DUE in March     I have reviewed the patient's past medical history, past surgical history, allergies,medications, social and family history and I have made updates where appropriate.    Past Medical History:   Diagnosis Date    Anxiety     B12 deficiency     Depression     Hypertension     Iron deficiency     Osteoporosis        Social History     Tobacco Use    Smoking status: Never    Smokeless tobacco: Never   Vaping Use    Vaping Use: Never used   Substance Use Topics    Alcohol use: Yes     Comment: Occasionally 3 glasses, Denies issues with binge drinking or alcohol abuse    Drug use: Yes     Comment: gummies on occasion       Family History   Problem Relation Age of Onset    Cancer Mother         kidney cancer    No Known Problems Father     No Known Problems Brother     No Known Problems Brother     No Known Problems Brother     No Known Problems Maternal Grandfather     Cancer Maternal Grandmother         kidney cancer    No Known Problems Paternal Grandfather     No Known Problems Paternal Grandmother          Review of Systems  Review of Systems    Constitutional:

## 2024-02-27 RX ORDER — BUPROPION HYDROCHLORIDE 150 MG/1
150 TABLET ORAL EVERY MORNING
Qty: 30 TABLET | Refills: 0 | Status: SHIPPED | OUTPATIENT
Start: 2024-02-27

## 2024-02-27 NOTE — TELEPHONE ENCOUNTER
Pharmacy is requesting a refill of the following medication:    Bupropion 150mg Extended Release  #30 with no refills to the [facility] on [address]. Previous prescription was sent on 01/22/24 for #30 with no refills. Last fill on 01/22/24.    Last visit 01/22/24  Next visit 03/04/24    Pending your approval

## 2024-03-22 ENCOUNTER — HOSPITAL ENCOUNTER (OUTPATIENT)
Dept: WOMENS IMAGING | Age: 64
Discharge: HOME | End: 2024-03-22
Payer: COMMERCIAL

## 2024-03-22 DIAGNOSIS — Z12.31 ENCOUNTER FOR SCREENING MAMMOGRAM FOR MALIGNANT NEOPLASM OF BREAST: ICD-10-CM

## 2024-03-22 PROCEDURE — 77063 BREAST TOMOSYNTHESIS BI: CPT

## 2024-03-26 ENCOUNTER — TELEPHONE (OUTPATIENT)
Dept: FAMILY MEDICINE CLINIC | Age: 64
End: 2024-03-26

## 2024-03-26 NOTE — TELEPHONE ENCOUNTER
----- Message from LASHANDA Galvin CNP sent at 3/26/2024  4:25 PM EDT -----  Mammogram benign, due to her having dense breast there is a recommendation that she can get an ultrasound, recommend calling your insurance first to see if they cover it.  Its called [facility]

## 2024-03-27 NOTE — TELEPHONE ENCOUNTER
Pt informed and understanding with no further questions at this time.     Patient will call insurance and let us know if they cover ABUS first

## 2024-04-01 RX ORDER — BUPROPION HYDROCHLORIDE 150 MG/1
150 TABLET ORAL EVERY MORNING
Qty: 30 TABLET | Refills: 0 | Status: SHIPPED | OUTPATIENT
Start: 2024-04-01 | End: 2024-04-03 | Stop reason: SDUPTHER

## 2024-04-01 NOTE — TELEPHONE ENCOUNTER
Patient called to clarify, she ran out of medication on Saturday and requests refill to be sent to pharmacy today if possible.     Requested Prescriptions     Pending Prescriptions Disp Refills    buPROPion (WELLBUTRIN XL) 150 MG extended release tablet [Pharmacy Med Name: BUPROPION HCL  MG TABLET] 30 tablet 0     Sig: TAKE 1 TABLET BY MOUTH EVERY DAY IN THE MORNING       Date of last visit: 1/22/2024  Date of next visit (if applicable):4/3/2024  Pharmacy Name: [facility] [address]      Thanks,  Modesta Wilson MA

## 2024-04-02 ENCOUNTER — COMMUNITY OUTREACH (OUTPATIENT)
Dept: FAMILY MEDICINE CLINIC | Age: 64
End: 2024-04-02

## 2024-04-02 NOTE — PROGRESS NOTES
Patient's HM shows they are overdue for Colorectal Screening.   Care Everywhere and  files searched.   updated with 9/27/22 Colonoscopy.

## 2024-04-03 ENCOUNTER — TELEMEDICINE (OUTPATIENT)
Dept: PSYCHIATRY | Age: 64
End: 2024-04-03
Payer: COMMERCIAL

## 2024-04-03 DIAGNOSIS — F34.1 PERSISTENT DEPRESSIVE DISORDER: Primary | ICD-10-CM

## 2024-04-03 DIAGNOSIS — G47.00 INSOMNIA, UNSPECIFIED TYPE: ICD-10-CM

## 2024-04-03 DIAGNOSIS — F41.1 GENERALIZED ANXIETY DISORDER: ICD-10-CM

## 2024-04-03 DIAGNOSIS — F43.0 ACUTE REACTION TO STRESS: ICD-10-CM

## 2024-04-03 PROCEDURE — 1123F ACP DISCUSS/DSCN MKR DOCD: CPT

## 2024-04-03 PROCEDURE — 90833 PSYTX W PT W E/M 30 MIN: CPT

## 2024-04-03 PROCEDURE — 99214 OFFICE O/P EST MOD 30 MIN: CPT

## 2024-04-03 RX ORDER — BUPROPION HYDROCHLORIDE 300 MG/1
300 TABLET ORAL EVERY MORNING
Qty: 30 TABLET | Refills: 0 | Status: SHIPPED | OUTPATIENT
Start: 2024-04-03 | End: 2024-05-03

## 2024-04-03 RX ORDER — MODAFINIL 200 MG/1
200 TABLET ORAL DAILY
COMMUNITY
Start: 2024-02-28 | End: 2024-04-03

## 2024-04-03 RX ORDER — DULOXETIN HYDROCHLORIDE 30 MG/1
30 CAPSULE, DELAYED RELEASE ORAL DAILY
Qty: 7 CAPSULE | Refills: 0
Start: 2024-04-03 | End: 2024-04-10

## 2024-04-03 NOTE — PROGRESS NOTES
release tablet     Sig: Take 1 tablet by mouth every morning     Dispense:  30 tablet     Refill:  0    DULoxetine (CYMBALTA) 30 MG extended release capsule     Sig: Take 1 capsule by mouth daily for 7 days     Dispense:  7 capsule     Refill:  0    DISCONTD: VORTIoxetine (TRINTELLIX) 5 MG tablet     Sig: Take 1 tablet by mouth daily for 7 days, THEN 2 tablets daily.     Dispense:  65 tablet     Refill:  0       Medications Discontinued During This Encounter   Medication Reason    DULoxetine (CYMBALTA) 30 MG extended release capsule LIST CLEANUP    modafinil (PROVIGIL) 200 MG tablet LIST CLEANUP    DULoxetine (CYMBALTA) 60 MG extended release capsule REORDER    buPROPion (WELLBUTRIN XL) 150 MG extended release tablet REORDER             Additional orders:  No orders of the defined types were placed in this encounter.    Condition: Patient appears in no acute distress  Patient is reporting depression and anxiety continue. She continues with HRT, PCP has started patient on Wellbutrin. Discussed with patient the importance of one provider managing psychiatric medications and advised her to contact this providers office regarding medication changes related to depression and anxiety. Patient is reporting acute stressors which appear to impact mood, patient has a difficult time managing stressors and processing negative emotions. Discussed the importance of individual psychotherapy in managing mood and anxiety and advised patient to establish care with a therapist, resources given. Patient is encouraged to utilize nonpharmacologic coping skills such as deep breathing, guided imagery, guided meditation, muscle relaxation, calming music, and/or journaling. Discussed treatment options with patient, she is asking to switching Wellbutrin XL to SR \"to give me some energy in the afternoons.\" She is also asking to switching Cymbalta to another medication for management of mood/anxiety. Discussed increase Wellbutrin staying on XL

## 2024-04-03 NOTE — PATIENT INSTRUCTIONS
-Please take medications as prescribed  -Refrain from alcohol or drug use  -Seek emergency help via the emergency and/or calling 911 should symptoms become severe, worsen, or with other concerning symptoms.Go immediately to the emergency room and/or call 911 with any suicidal or homicidal ideations or if audio/visual hallucinations develop.   -Contact office with any questions or concerns.     Crisis phone numbers:  [facility] [phone].  [facility] [phone]  [facility] [phone].  [facility] [phone].  [facility] [phone].  [facility] [phone].

## 2024-04-10 ENCOUNTER — TELEPHONE (OUTPATIENT)
Dept: PSYCHIATRY | Age: 64
End: 2024-04-10

## 2024-04-10 NOTE — TELEPHONE ENCOUNTER
Easton called into the office stating that this provider had told her that if the newly prescribed Trintellix was too expensive for her to let the office know; she said that she was at [facility] and it was over $100/30 day supply. She is asking for an alternative medication?    Please advise. She is unable to use a Copay Savings Card as she has Medicare insurance. She is not scheduled to return until 05/07/24.

## 2024-04-11 PROBLEM — F43.0 ACUTE REACTION TO STRESS: Status: ACTIVE | Noted: 2024-04-11

## 2024-04-11 RX ORDER — FLUOXETINE 10 MG/1
CAPSULE ORAL
Qty: 53 CAPSULE | Refills: 0 | Status: SHIPPED | OUTPATIENT
Start: 2024-04-11 | End: 2024-05-11

## 2024-04-11 NOTE — TELEPHONE ENCOUNTER
Patient informed and voiced understanding. She will continue with current treatment plan. Thank you

## 2024-04-11 NOTE — TELEPHONE ENCOUNTER
Patient called regarding Prozac Rx. She reports she read there is a possible interaction between Prozac and Wellbutrin. Notes this combination can cause seizures. Patient wonders if this is correct? If provider feels this is a safe combination? Please advise.       Last visit- 4/3/2024  Next visit- 5/7/2024

## 2024-04-11 NOTE — TELEPHONE ENCOUNTER
Left detailed message with this information along with if she has any further questions/concerns to give the office a call.

## 2024-04-11 NOTE — TELEPHONE ENCOUNTER
Wellbutrin alone can lower the seizure threshold, which could result in seizures.     The combination of Wellbutrin and an SSRI could increase the risk for seizures as they both can lower seizure threshold. With this being said Wellbutrin and SSRIs are often used concurrently.    Wellbutrin could affect the metabolism of Prozac meaning it could increase serum concentrations of Prozac. This would mean we would use lower doses of the Prozac vs. Titration to a max dose.      With all that being said, it is up to the patient if she would like to continue with treatment with both or either of these medications knowing the risks.

## 2024-05-07 ENCOUNTER — TELEMEDICINE (OUTPATIENT)
Dept: PSYCHIATRY | Age: 64
End: 2024-05-07
Payer: MEDICARE

## 2024-05-07 DIAGNOSIS — F43.0 ACUTE REACTION TO STRESS: ICD-10-CM

## 2024-05-07 DIAGNOSIS — F41.1 GENERALIZED ANXIETY DISORDER: ICD-10-CM

## 2024-05-07 DIAGNOSIS — F34.1 PERSISTENT DEPRESSIVE DISORDER: Primary | ICD-10-CM

## 2024-05-07 DIAGNOSIS — G47.00 INSOMNIA, UNSPECIFIED TYPE: ICD-10-CM

## 2024-05-07 PROCEDURE — 1123F ACP DISCUSS/DSCN MKR DOCD: CPT

## 2024-05-07 PROCEDURE — 99214 OFFICE O/P EST MOD 30 MIN: CPT

## 2024-05-07 PROCEDURE — 90833 PSYTX W PT W E/M 30 MIN: CPT

## 2024-05-07 RX ORDER — FLUOXETINE 10 MG/1
10 CAPSULE ORAL DAILY
COMMUNITY

## 2024-05-07 RX ORDER — DULOXETIN HYDROCHLORIDE 20 MG/1
20 CAPSULE, DELAYED RELEASE ORAL DAILY
Qty: 7 CAPSULE | Refills: 0 | Status: SHIPPED | OUTPATIENT
Start: 2024-05-07 | End: 2024-05-16 | Stop reason: SDUPTHER

## 2024-05-07 RX ORDER — FLUOXETINE HYDROCHLORIDE 20 MG/1
20 CAPSULE ORAL DAILY
Qty: 30 CAPSULE | Refills: 0 | Status: SHIPPED | OUTPATIENT
Start: 2024-05-07 | End: 2024-06-06

## 2024-05-07 NOTE — PROGRESS NOTES
[facility] [place] [facility]  [facility]  [address]  [facility] [address]  Dept: [phone]  Dept Fax: [phone]  Loc: [phone]    Visit Date: 5/7/2024    SUBJECTIVE DATA     CHIEF COMPLAINT:    Chief Complaint   Patient presents with    Anxiety    Depression    Follow-up       History obtained from: patient    HISTORY OF PRESENT ILLNESS:    Easton Montoya is a 64 y.o. female who presents for follow up for management of mood and anxiety. Last seen 1-2024  Reports medication compliance,  denies side effects. States she is doing \"ok.\"  States when attempting to taper Cymbalta she became \"dizzy,\" denies nausea, vomiting or syncope.     HPI      Endorses depression   -Patient endorses feeling sad, down and depressed most days   -Denies anhedonia  -Denies issues with Concentration  -Reports appetite is stable   -Reports occasional feelings of guilt  -Denies feeling Hopeless or helpless  -Reports decreased Energy/Motivation continues   -Denies thoughts of harm to self or others     Endorses sleep disturbances   -Denies trouble initiating sleep, endorses difficulty maintaining sleep   -Avg hours of sleep: 12 hours average nightly   -Reports she does not feel rested in the mornings  -Snores: denies   -Reports she does not maintain a normal sleep routine    Endorses Anxiety  -Reports worrying and trouble  controlling worry   -Reports feeling nervous and on edge for no apparent reason \"at times \"   -Reports she can be easily irritated and annoyed on occasion   -Denies feeling restless or trouble relaxing     Denies hallucinations, delusions, homicidal ideations or suicidal ideations.     Support -      She is following with a HRT clinic    She recently retired  -States \"I have been trying to figure out what to do with my life.\"     Reports stressors with her daughter  -States \"She recently  from her  and I am worried about her and her kids.\"

## 2024-05-07 NOTE — PATIENT INSTRUCTIONS
-Please take medications as prescribed  -Refrain from alcohol or drug use  -Seek emergency help via the emergency and/or calling 911 should symptoms become severe, worsen, or with other concerning symptoms.Go immediately to the emergency room and/or call 911 with any suicidal or homicidal ideations or if audio/visual hallucinations develop.   -Contact office with any questions or concerns.     Crisis phone numbers:  [facility] [phone].  [facility] [phone]  [facility] [phone].  [facility] [phone].  [facility] [phone].  [facility] [phone].

## 2024-05-13 RX ORDER — BUPROPION HYDROCHLORIDE 300 MG/1
300 TABLET ORAL EVERY MORNING
Qty: 30 TABLET | Refills: 0 | Status: SHIPPED | OUTPATIENT
Start: 2024-05-13 | End: 2024-06-12

## 2024-05-13 NOTE — TELEPHONE ENCOUNTER
Patient called in requesting a refill of the following mediation:    Bupropion 300mg Extended Release  #30 with no refills to the [facility] on [address]. Previous prescription was sent on 04/03/24 for #30 with no refills.    Last visit 05/07/24  Next visit 05/28/24    Pending your approval

## 2024-05-15 ENCOUNTER — TELEPHONE (OUTPATIENT)
Dept: PSYCHIATRY | Age: 64
End: 2024-05-15

## 2024-05-15 NOTE — TELEPHONE ENCOUNTER
We could add on the Cymbalta for a few more days and increase the Prozac dose. Or If she is wanting to go back on the Cymbalta vs. Prozac we could do that as well.

## 2024-05-15 NOTE — TELEPHONE ENCOUNTER
Patient would like to go back on cymbalta for a few more days but she does not want to increase prozac at this time. Patient asks if provider wants her to take 20 mg or 30 mg of cymbalta, mentions she has five of the 30 mg remaining. Patient asks if she should continue 20 mg prozac or decrease to 10 mg as well?

## 2024-05-15 NOTE — TELEPHONE ENCOUNTER
Patient called to report she has been feeling nauseous and dizzy due to medication changes. Reports she is now off of the 20 mg Cymbalta as of yesterday. Reports being on Prozac 20 mg about one week. Patient states the nausea and dizziness started yesterday. Denies headaches, sob, chest pain, vision changes.     Patient reports she discussed with provider that this happened when coming off of the Cymbalta previously and this is why provider had her take the 20 mg Cymbalta for one week. Patient reports she is going out of town tomorrow afternoon, offered appointment tomorrow morning to discuss. Patient declined as she was just seen for an appointment, please advise recommendations.     Patient denies SI/HI, hallucinations and/or delusions.  Denies mood changes.

## 2024-05-16 RX ORDER — DULOXETIN HYDROCHLORIDE 20 MG/1
20 CAPSULE, DELAYED RELEASE ORAL DAILY
Qty: 7 CAPSULE | Refills: 0 | Status: SHIPPED | OUTPATIENT
Start: 2024-05-16 | End: 2024-05-23

## 2024-05-16 NOTE — TELEPHONE ENCOUNTER
Restart 20 mg PO daily of Cymbalta. Continue Prozac 20 mg PO daily. Move up f/u appointment to discuss further medications changes, I.e. if she is wanting to continue titration of Prozac or go back on Cymbalta.     Thank you

## 2024-05-22 ENCOUNTER — TELEMEDICINE (OUTPATIENT)
Dept: PSYCHIATRY | Age: 64
End: 2024-05-22
Payer: MEDICARE

## 2024-05-22 ENCOUNTER — TELEPHONE (OUTPATIENT)
Dept: PSYCHIATRY | Age: 64
End: 2024-05-22

## 2024-05-22 DIAGNOSIS — G47.00 INSOMNIA, UNSPECIFIED TYPE: ICD-10-CM

## 2024-05-22 DIAGNOSIS — F41.1 GENERALIZED ANXIETY DISORDER: ICD-10-CM

## 2024-05-22 DIAGNOSIS — F34.1 PERSISTENT DEPRESSIVE DISORDER: Primary | ICD-10-CM

## 2024-05-22 DIAGNOSIS — F43.9 STRESS AT HOME: ICD-10-CM

## 2024-05-22 PROCEDURE — 1123F ACP DISCUSS/DSCN MKR DOCD: CPT

## 2024-05-22 PROCEDURE — 99214 OFFICE O/P EST MOD 30 MIN: CPT

## 2024-05-22 RX ORDER — PROGESTERONE 100 MG/1
300 CAPSULE ORAL NIGHTLY
COMMUNITY

## 2024-05-22 RX ORDER — PAROXETINE 10 MG/1
TABLET, FILM COATED ORAL
Refills: 0 | OUTPATIENT
Start: 2024-05-22

## 2024-05-22 RX ORDER — DULOXETIN HYDROCHLORIDE 20 MG/1
CAPSULE, DELAYED RELEASE ORAL
Qty: 4 CAPSULE | Refills: 0 | Status: SHIPPED | OUTPATIENT
Start: 2024-05-22

## 2024-05-22 RX ORDER — DULOXETIN HYDROCHLORIDE 20 MG/1
CAPSULE, DELAYED RELEASE ORAL
Qty: 4 CAPSULE | Refills: 0 | Status: SHIPPED | OUTPATIENT
Start: 2024-05-22 | End: 2024-05-22 | Stop reason: SDUPTHER

## 2024-05-22 RX ORDER — FLUOXETINE 10 MG/1
30 CAPSULE ORAL DAILY
Qty: 90 CAPSULE | Refills: 0 | Status: SHIPPED | OUTPATIENT
Start: 2024-05-22 | End: 2024-05-24 | Stop reason: SDUPTHER

## 2024-05-22 NOTE — TELEPHONE ENCOUNTER
Patient informed, she would like Rx written for 10 mg daily in addition to 20 mg for a total of 30 mg daily. States insurance covered this previously. Please advise.

## 2024-05-22 NOTE — TELEPHONE ENCOUNTER
[facility] faxed over a Missing/Illegible information paper on patients Duloxetine HCL 20mg prescription. Patient was seen today.    Pharmacy stated Sig code Does not have directions.    Pending your advice

## 2024-05-22 NOTE — PATIENT INSTRUCTIONS
-Please take medications as prescribed  -Refrain from alcohol or drug use  -Seek emergency help via the emergency and/or calling 911 should symptoms become severe, worsen, or with other concerning symptoms.Go immediately to the emergency room and/or call 911 with any suicidal or homicidal ideations or if audio/visual hallucinations develop.   -Contact office with any questions or concerns.     Crisis phone numbers:  [facility] [phone].  [facility] [phone]  [facility] [phone].  [facility] [phone].  [facility] [phone].  [facility] [phone].

## 2024-05-22 NOTE — TELEPHONE ENCOUNTER
Please reach out to patient, as insurance will not cover 30 mg/day does she want to increase to 40 mg/day to see if insurance will cover this? Good rx is also an option, if insurance will not cover either rx.

## 2024-05-22 NOTE — PROGRESS NOTES
no further labs ordered at this time.     EKG: reviewed from 9-26-23 QTc 440      Patient has been instructed to seek emergency help via the emergency and/or calling 911 should symptoms become severe, worsen, or with other concerning symptoms. Patient instructed to go immediately to the emergency room and/or call 911 with any suicidal or homicidal ideations or if audio/visual hallucinations develop.  Patient given crisis center information. Patient stated understanding and agrees.       Easton Montoya, was evaluated through a synchronous (real-time) audio-video encounter. The patient (or guardian if applicable) is aware that this is a billable service, which includes applicable co-pays. This Virtual Visit was conducted with patient's (and/or legal guardian's) consent. Patient identification was verified, and a caregiver was present when appropriate.   The patient was located at Home: [address] [place] [id number]  Provider was located at Home (Appt Dept State): [place]  Confirm you are appropriately licensed, registered, or certified to deliver care in the state where the patient is located as indicated above. If you are not or unsure, please re-schedule the visit: Yes, I confirm.                  Time spent with patient: not billed by time     Provider Signature:  Electronically signed by LASHANDA Patel CNP on 5/28/2024 at 5:08 PM    **This report has been created using voice recognition software. It may contain minor errors which are inherent in voice recognition technology.**    I have personally performed a face to face diagnostic evaluation on this patient. I have reviewed the ACP note and agree with the history, exam and plan of care, except as noted.

## 2024-05-22 NOTE — TELEPHONE ENCOUNTER
Pharmacy sent the following refill request with comment \"INSURANCE WILL NOT PAY FOR 3 DAILY\". Please advise.       Last visit- 5/22/2024  Next visit- 6/18/2024

## 2024-05-24 RX ORDER — FLUOXETINE HYDROCHLORIDE 20 MG/1
20 CAPSULE ORAL DAILY
Qty: 30 CAPSULE | Refills: 0 | Status: SHIPPED | OUTPATIENT
Start: 2024-05-24

## 2024-05-24 RX ORDER — FLUOXETINE 10 MG/1
10 CAPSULE ORAL DAILY
Qty: 30 CAPSULE | Refills: 0 | Status: SHIPPED | OUTPATIENT
Start: 2024-05-24 | End: 2024-06-23

## 2024-05-24 NOTE — TELEPHONE ENCOUNTER
Can we see where she was diagnosed with DVT? She has fu tomorrow for it. And I have not records.  Thank you   Imaging Studies

## 2024-05-28 ENCOUNTER — TELEPHONE (OUTPATIENT)
Dept: FAMILY MEDICINE CLINIC | Age: 64
End: 2024-05-28

## 2024-05-28 ENCOUNTER — TELEPHONE (OUTPATIENT)
Dept: PSYCHIATRY | Age: 64
End: 2024-05-28

## 2024-05-28 DIAGNOSIS — R92.30 INCONCLUSIVE MAMMOGRAPHY DUE TO DENSE BREASTS: Primary | ICD-10-CM

## 2024-05-28 DIAGNOSIS — R92.2 INCONCLUSIVE MAMMOGRAPHY DUE TO DENSE BREASTS: Primary | ICD-10-CM

## 2024-05-28 NOTE — TELEPHONE ENCOUNTER
Patient called in asking about Genesight Testing.    Patient stated her OBGYN recommended getting the testing done since she is on hormone therapy and mental health medications. Patient stated she isn't having any new issues with her mental health, just would like the testing done.    Last visit 05/22/24  Next visit 06/18/24    Pending your advice.

## 2024-05-28 NOTE — TELEPHONE ENCOUNTER
Pt called in and would like to proceed with getting the ABUS testing done.     Will transfer to central scheduling once testing is ordered

## 2024-05-28 NOTE — TELEPHONE ENCOUNTER
Patient called in stating insurance needed a CPT code. Staff gave patient [id number] and [id number]. Patient then called back saying her insurance told her a PA needed done. Patient was advised to call [employer] directly and talk to them about cost and PA's. Patient was given [facility] phone number.

## 2024-05-28 NOTE — TELEPHONE ENCOUNTER
If patient is wanting to complete genesight testing she may, she will just need to present to the office during office hours to have testing completed.

## 2024-05-28 NOTE — TELEPHONE ENCOUNTER
Patient informed and verbally understood. Patient is going to call her insurance to see if they cover the testing.

## 2024-05-29 ENCOUNTER — TELEPHONE (OUTPATIENT)
Dept: PSYCHIATRY | Age: 64
End: 2024-05-29

## 2024-05-29 NOTE — TELEPHONE ENCOUNTER
Easton wrote into the office via [email or web address]:    [facility] is not taking any new clients. Do you have any other suggestions for counseling?     Thank you  Easton Motnoya     Please advise.

## 2024-05-29 NOTE — TELEPHONE ENCOUNTER
Please check with patient, last visit she mentioned she was established with a therapist at [facility]? If so I would suggest she continue there as there could be a wait to get in elsewhere.  Please provide with resources.

## 2024-05-29 NOTE — TELEPHONE ENCOUNTER
Wrote back to Easton with this information via [email or web address]; resource have also been sent to the patient.

## 2024-05-31 ENCOUNTER — HOSPITAL ENCOUNTER (OUTPATIENT)
Dept: WOMENS IMAGING | Age: 64
Discharge: HOME | End: 2024-05-31
Payer: MEDICARE

## 2024-05-31 DIAGNOSIS — R92.30 INCONCLUSIVE MAMMOGRAPHY DUE TO DENSE BREASTS: ICD-10-CM

## 2024-05-31 DIAGNOSIS — R92.2 INCONCLUSIVE MAMMOGRAPHY DUE TO DENSE BREASTS: ICD-10-CM

## 2024-05-31 PROCEDURE — 76641 ULTRASOUND BREAST COMPLETE: CPT

## 2024-06-03 ENCOUNTER — TELEPHONE (OUTPATIENT)
Dept: FAMILY MEDICINE CLINIC | Age: 64
End: 2024-06-03

## 2024-06-03 NOTE — TELEPHONE ENCOUNTER
----- Message from LASHANDA Galvin CNP sent at 6/3/2024  1:12 PM EDT -----  Us normal. Repeat mammogram 1 year

## 2024-06-05 RX ORDER — BUPROPION HYDROCHLORIDE 300 MG/1
300 TABLET ORAL EVERY MORNING
Qty: 90 TABLET | Refills: 0 | Status: SHIPPED | OUTPATIENT
Start: 2024-06-05 | End: 2024-07-26

## 2024-06-05 NOTE — TELEPHONE ENCOUNTER
[facility] is requesting a medication refill on Easton's behalf for Wellbutrin XL 300mg;#30 with 0 refills; last with a start and refill date of 05/13/24. She is scheduled to return on 06/18/24 leaving her short of medication.    Pharmacy requested for 90 day supply but Rx was switched to 30; if patient needs 90 days will need to call. She was last seen on 05/22/24. Please advise otherwise.

## 2024-06-18 ENCOUNTER — TELEMEDICINE (OUTPATIENT)
Dept: PSYCHIATRY | Age: 64
End: 2024-06-18
Payer: MEDICARE

## 2024-06-18 ENCOUNTER — TELEPHONE (OUTPATIENT)
Dept: PSYCHIATRY | Age: 64
End: 2024-06-18

## 2024-06-18 DIAGNOSIS — F34.1 PERSISTENT DEPRESSIVE DISORDER: Primary | ICD-10-CM

## 2024-06-18 DIAGNOSIS — F41.1 GENERALIZED ANXIETY DISORDER: ICD-10-CM

## 2024-06-18 DIAGNOSIS — F43.0 ACUTE REACTION TO STRESS: ICD-10-CM

## 2024-06-18 DIAGNOSIS — G47.00 INSOMNIA, UNSPECIFIED TYPE: ICD-10-CM

## 2024-06-18 PROCEDURE — 99214 OFFICE O/P EST MOD 30 MIN: CPT

## 2024-06-18 PROCEDURE — 1123F ACP DISCUSS/DSCN MKR DOCD: CPT

## 2024-06-18 PROCEDURE — 90833 PSYTX W PT W E/M 30 MIN: CPT

## 2024-06-18 RX ORDER — FLUOXETINE HYDROCHLORIDE 40 MG/1
40 CAPSULE ORAL DAILY
Qty: 30 CAPSULE | Refills: 0 | Status: SHIPPED | OUTPATIENT
Start: 2024-06-18 | End: 2024-07-18

## 2024-06-18 NOTE — PROGRESS NOTES
[facility] [place] [facility]  [facility] - [facility]  [address]  [facility] [address]  Dept: [phone]  Dept Fax: [phone]  Loc: [phone]    Visit Date: 6/18/2024    SUBJECTIVE DATA     CHIEF COMPLAINT:    Chief Complaint   Patient presents with    Anxiety    Depression    Follow-up       History obtained from: patient    HISTORY OF PRESENT ILLNESS:    Easton Montoya is a 64 y.o. female who presents for follow up for management of mood and anxiety.   Reports medication compliance,  denies side effects. States she is doing \"ok.\"  She is asking about switching Prozac to Zoloft states \"I have done research and found that zoloft is a good combination with Wellbutrin. \"  She is also asking about d/c medications \"because I think I am on too many medications.\"  She also is asking about genesight testing     HPI      Endorses depression   -Patient endorses feeling sad, down and depressed most days   -Denies anhedonia  -Endorses issues with Concentration \"sometimes\"   -Reports appetite is stable   -Reports occasional feelings of guilt  -Denies feeling Hopeless or helpless  -Reports decreased Energy/Motivation continues which is her major complaint this visit   -Denies thoughts of harm to self or others     Endorses sleep disturbances   -Endorses trouble initiating sleep and maintaining sleep   -Avg hours of sleep: 10 hours average nightly broken  -Reports she does not feel rested in the mornings  -Snores: denies   -Reports she does not maintain a normal sleep routine and will nap most days     Endorses Anxiety  -Reports worrying and trouble controlling worry \"at times\"   -Reports feeling nervous and on edge for no apparent reason \"sometimes\"   -Reports she can be easily irritated and annoyed on occasion   -Denies feeling restless or trouble relaxing     Denies hallucinations, delusions, homicidal ideations or suicidal ideations.     Support -      She is following

## 2024-06-25 PROBLEM — F32.1 CURRENT MODERATE EPISODE OF MAJOR DEPRESSIVE DISORDER WITHOUT PRIOR EPISODE (HCC): Status: RESOLVED | Noted: 2019-02-18 | Resolved: 2024-06-25

## 2024-06-25 NOTE — PATIENT INSTRUCTIONS
-Please take medications as prescribed  -Refrain from alcohol or drug use  -Seek emergency help via the emergency and/or calling 911 should symptoms become severe, worsen, or with other concerning symptoms.Go immediately to the emergency room and/or call 911 with any suicidal or homicidal ideations or if audio/visual hallucinations develop.   -Contact office with any questions or concerns.     Crisis phone numbers:  [facility] [phone].  [facility] [phone]  [facility] [phone].  [facility] [phone].  [facility] [phone].  [facility] [phone].

## 2024-07-01 DIAGNOSIS — E53.8 VITAMIN B12 DEFICIENCY: ICD-10-CM

## 2024-07-01 RX ORDER — SYRINGE, DISPOSABLE, 1 ML
SYRINGE, EMPTY DISPOSABLE MISCELLANEOUS
Qty: 100 EACH | Refills: 3 | Status: SHIPPED | OUTPATIENT
Start: 2024-07-01

## 2024-07-26 ENCOUNTER — TELEMEDICINE (OUTPATIENT)
Dept: PSYCHIATRY | Age: 64
End: 2024-07-26
Payer: MEDICARE

## 2024-07-26 DIAGNOSIS — G47.00 INSOMNIA, UNSPECIFIED TYPE: ICD-10-CM

## 2024-07-26 DIAGNOSIS — F41.1 GENERALIZED ANXIETY DISORDER: ICD-10-CM

## 2024-07-26 DIAGNOSIS — F34.1 PERSISTENT DEPRESSIVE DISORDER: Primary | ICD-10-CM

## 2024-07-26 PROCEDURE — 99214 OFFICE O/P EST MOD 30 MIN: CPT

## 2024-07-26 PROCEDURE — 1123F ACP DISCUSS/DSCN MKR DOCD: CPT

## 2024-07-26 RX ORDER — BUPROPION HYDROCHLORIDE 100 MG/1
100 TABLET, EXTENDED RELEASE ORAL 2 TIMES DAILY
Qty: 60 TABLET | Refills: 0 | Status: SHIPPED | OUTPATIENT
Start: 2024-07-26 | End: 2024-07-26

## 2024-07-26 RX ORDER — FLUOXETINE HYDROCHLORIDE 40 MG/1
40 CAPSULE ORAL DAILY
Qty: 30 CAPSULE | Refills: 0 | Status: SHIPPED | OUTPATIENT
Start: 2024-07-26 | End: 2024-07-26

## 2024-07-26 RX ORDER — FLUOXETINE HYDROCHLORIDE 40 MG/1
40 CAPSULE ORAL DAILY
Qty: 30 CAPSULE | Refills: 1 | Status: SHIPPED | OUTPATIENT
Start: 2024-07-26 | End: 2024-09-24

## 2024-07-26 RX ORDER — BUPROPION HYDROCHLORIDE 100 MG/1
100 TABLET, EXTENDED RELEASE ORAL 2 TIMES DAILY
Qty: 60 TABLET | Refills: 1 | Status: SHIPPED | OUTPATIENT
Start: 2024-07-26 | End: 2024-09-24

## 2024-07-26 NOTE — PROGRESS NOTES
Duplicate  Annalise Clifford RN, BSN  Message handled by Nurse Triage.    
Message from [employer]:  Original authorizing provider: Abelino Flores MD    Annalise MELARADanielle Rodrigo would like a refill of the following medications:  oxyCODONE-acetaminophen (PERCOCET) 5-325 MG per tablet [Abelino Flores MD]    Preferred pharmacy: [facility] [id number]    Comment:    
synchronous (real-time) audio-video encounter. The patient (or guardian if applicable) is aware that this is a billable service, which includes applicable co-pays. This Virtual Visit was conducted with patient's (and/or legal guardian's) consent. Patient identification was verified, and a caregiver was present when appropriate.   The patient was located at Home: [address] [place], [place] [address] [place] [address]  Provider was located at Home (Appt Dept State): [place]  Confirm you are appropriately licensed, registered, or certified to deliver care in the state where the patient is located as indicated above. If you are not or unsure, please re-schedule the visit: Yes, I confirm.                  Time spent with patient: not billed by time     Provider Signature:  Electronically signed by LASHANDA Patel CNP on 7/31/2024 at 2:26 PM    **This report has been created using voice recognition software. It may contain minor errors which are inherent in voice recognition technology.**

## 2024-07-26 NOTE — PATIENT INSTRUCTIONS
-Please take medications as prescribed  -Refrain from alcohol or drug use  -Seek emergency help via the emergency and/or calling 911 should symptoms become severe, worsen, or with other concerning symptoms.Go immediately to the emergency room and/or call 911 with any suicidal or homicidal ideations or if audio/visual hallucinations develop.   -Contact office with any questions or concerns.     Crisis phone numbers:  [facility] [phone].  [facility] [phone]  [facility] [phone].  [facility] [phone].  [facility] [phone].  [facility] [phone].

## 2024-07-29 ENCOUNTER — APPOINTMENT (OUTPATIENT)
Dept: PRIMARY CARE | Facility: CLINIC | Age: 64
End: 2024-07-29
Payer: MEDICARE

## 2024-07-29 VITALS
HEIGHT: 65 IN | SYSTOLIC BLOOD PRESSURE: 120 MMHG | BODY MASS INDEX: 23.26 KG/M2 | WEIGHT: 139.6 LBS | HEART RATE: 82 BPM | DIASTOLIC BLOOD PRESSURE: 82 MMHG

## 2024-07-29 DIAGNOSIS — Z12.31 ENCOUNTER FOR SCREENING MAMMOGRAM FOR BREAST CANCER: ICD-10-CM

## 2024-07-29 DIAGNOSIS — F41.9 ANXIETY: ICD-10-CM

## 2024-07-29 DIAGNOSIS — R53.81 PHYSICAL DECONDITIONING: ICD-10-CM

## 2024-07-29 DIAGNOSIS — Z12.11 SCREENING FOR COLORECTAL CANCER: ICD-10-CM

## 2024-07-29 DIAGNOSIS — Z13.6 SCREENING FOR CARDIOVASCULAR CONDITION: ICD-10-CM

## 2024-07-29 DIAGNOSIS — Z00.00 HEALTHCARE MAINTENANCE: Primary | ICD-10-CM

## 2024-07-29 DIAGNOSIS — E78.5 HYPERLIPIDEMIA, UNSPECIFIED HYPERLIPIDEMIA TYPE: ICD-10-CM

## 2024-07-29 DIAGNOSIS — M81.0 OSTEOPOROSIS, UNSPECIFIED OSTEOPOROSIS TYPE, UNSPECIFIED PATHOLOGICAL FRACTURE PRESENCE: ICD-10-CM

## 2024-07-29 DIAGNOSIS — Z12.12 SCREENING FOR COLORECTAL CANCER: ICD-10-CM

## 2024-07-29 DIAGNOSIS — R03.0 ELEVATED BLOOD PRESSURE READING: ICD-10-CM

## 2024-07-29 DIAGNOSIS — M81.0 AGE-RELATED OSTEOPOROSIS WITHOUT CURRENT PATHOLOGICAL FRACTURE: ICD-10-CM

## 2024-07-29 DIAGNOSIS — E53.8 VITAMIN B12 DEFICIENCY: ICD-10-CM

## 2024-07-29 DIAGNOSIS — Z00.00 ROUTINE GENERAL MEDICAL EXAMINATION AT HEALTH CARE FACILITY: ICD-10-CM

## 2024-07-29 DIAGNOSIS — D50.9 IRON DEFICIENCY ANEMIA, UNSPECIFIED IRON DEFICIENCY ANEMIA TYPE: ICD-10-CM

## 2024-07-29 DIAGNOSIS — E55.9 VITAMIN D DEFICIENCY: ICD-10-CM

## 2024-07-29 PROBLEM — F32.1 CURRENT MODERATE EPISODE OF MAJOR DEPRESSIVE DISORDER WITHOUT PRIOR EPISODE (MULTI): Status: RESOLVED | Noted: 2019-02-18 | Resolved: 2024-07-29

## 2024-07-29 PROBLEM — E61.1 IRON DEFICIENCY: Status: RESOLVED | Noted: 2019-02-18 | Resolved: 2024-07-29

## 2024-07-29 PROBLEM — M89.9 DISORDER OF BONE: Status: ACTIVE | Noted: 2023-03-23

## 2024-07-29 PROBLEM — R31.29 MICROSCOPIC HEMATURIA: Status: ACTIVE | Noted: 2024-06-09

## 2024-07-29 PROBLEM — M85.9 DISORDER OF BONE DENSITY AND STRUCTURE, UNSPECIFIED: Status: RESOLVED | Noted: 2023-03-23 | Resolved: 2024-07-29

## 2024-07-29 PROBLEM — N95.2 ATROPHIC VAGINITIS: Status: ACTIVE | Noted: 2024-05-23

## 2024-07-29 PROCEDURE — 1036F TOBACCO NON-USER: CPT | Performed by: FAMILY MEDICINE

## 2024-07-29 PROCEDURE — 3008F BODY MASS INDEX DOCD: CPT | Performed by: FAMILY MEDICINE

## 2024-07-29 PROCEDURE — 1170F FXNL STATUS ASSESSED: CPT | Performed by: FAMILY MEDICINE

## 2024-07-29 PROCEDURE — 1159F MED LIST DOCD IN RCRD: CPT | Performed by: FAMILY MEDICINE

## 2024-07-29 PROCEDURE — G0403 EKG FOR INITIAL PREVENT EXAM: HCPCS | Performed by: FAMILY MEDICINE

## 2024-07-29 PROCEDURE — G0402 INITIAL PREVENTIVE EXAM: HCPCS | Performed by: FAMILY MEDICINE

## 2024-07-29 RX ORDER — FLUOXETINE HYDROCHLORIDE 40 MG/1
1 CAPSULE ORAL
COMMUNITY
Start: 2024-06-18 | End: 2024-07-29 | Stop reason: WASHOUT

## 2024-07-29 ASSESSMENT — ENCOUNTER SYMPTOMS
HEADACHES: 0
LOSS OF SENSATION IN FEET: 0
DIZZINESS: 0
DEPRESSION: 1
BACK PAIN: 0
COLOR CHANGE: 0
APPETITE CHANGE: 0
LIGHT-HEADEDNESS: 0
COUGH: 0
CHOKING: 0
ACTIVITY CHANGE: 0
ARTHRALGIAS: 0
FACIAL ASYMMETRY: 0
FATIGUE: 0
OCCASIONAL FEELINGS OF UNSTEADINESS: 0
FEVER: 0
PALPITATIONS: 0
CHEST TIGHTNESS: 0

## 2024-07-29 ASSESSMENT — PATIENT HEALTH QUESTIONNAIRE - PHQ9
1. LITTLE INTEREST OR PLEASURE IN DOING THINGS: MORE THAN HALF THE DAYS
9. THOUGHTS THAT YOU WOULD BE BETTER OFF DEAD, OR OF HURTING YOURSELF: NOT AT ALL
4. FEELING TIRED OR HAVING LITTLE ENERGY: MORE THAN HALF THE DAYS
10. IF YOU CHECKED OFF ANY PROBLEMS, HOW DIFFICULT HAVE THESE PROBLEMS MADE IT FOR YOU TO DO YOUR WORK, TAKE CARE OF THINGS AT HOME, OR GET ALONG WITH OTHER PEOPLE: SOMEWHAT DIFFICULT
2. FEELING DOWN, DEPRESSED OR HOPELESS: MORE THAN HALF THE DAYS
7. TROUBLE CONCENTRATING ON THINGS, SUCH AS READING THE NEWSPAPER OR WATCHING TELEVISION: NOT AT ALL
3. TROUBLE FALLING OR STAYING ASLEEP OR SLEEPING TOO MUCH: MORE THAN HALF THE DAYS
SUM OF ALL RESPONSES TO PHQ QUESTIONS 1-9: 8
6. FEELING BAD ABOUT YOURSELF - OR THAT YOU ARE A FAILURE OR HAVE LET YOURSELF OR YOUR FAMILY DOWN: NOT AT ALL
SUM OF ALL RESPONSES TO PHQ9 QUESTIONS 1 AND 2: 4
8. MOVING OR SPEAKING SO SLOWLY THAT OTHER PEOPLE COULD HAVE NOTICED. OR THE OPPOSITE, BEING SO FIGETY OR RESTLESS THAT YOU HAVE BEEN MOVING AROUND A LOT MORE THAN USUAL: NOT AT ALL
5. POOR APPETITE OR OVEREATING: NOT AT ALL

## 2024-07-29 ASSESSMENT — ACTIVITIES OF DAILY LIVING (ADL)
DOING_HOUSEWORK: INDEPENDENT
TAKING_MEDICATION: INDEPENDENT
GROCERY_SHOPPING: INDEPENDENT
DRESSING: INDEPENDENT
BATHING: INDEPENDENT
MANAGING_FINANCES: INDEPENDENT

## 2024-07-29 ASSESSMENT — VISUAL ACUITY
OD_CC: 20/13
OS_CC: 20/70

## 2024-07-29 NOTE — PROGRESS NOTES
Subjective   Reason for Visit: Panda Valdes is an 64 y.o. female here for a Medicare Wellness visit.     Past Medical, Surgical, and Family History reviewed and updated in chart.    Reviewed all medications by prescribing practitioner or clinical pharmacist (such as prescriptions, OTCs, herbal therapies and supplements) and documented in the medical record.    HPI  Patient presents for physical exam.      Fam Hx  MGM kidney cancer  Mom (79) , kidney CA  Dad (93) living, HTN, prostate cancer  three brothers living, melanoma     Exercise walks  ETOH one glass of wine/weekends  Caffeine 2 cups of coffee/day  Tobacco denies     OB/GYN Dr. Nieves     Mammogram due   DEXA  osteoporosis due   Colonoscopy 2013 due , consider cologuard     Patient denies other complaints.   Patient Self Assessment of Health Status  Patient Self Assessment: Good    Nutrition and Exercise  Current Diet: Well Balanced Diet  Adequate Fluid Intake: Yes  Caffeine: Yes  Exercise Frequency: Infrequently    Functional Ability/Level of Safety  Cognitive Impairment Observed: No cognitive impairment observed  Cognitive Impairment Reported: No cognitive impairment reported by patient or family    Home Safety Risk Factors: None    Patient Care Team:  Marcella Bernard DO as PCP - General  Jack Nieves MD as Referring Physician (Obstetrics and Gynecology)     Review of Systems   Constitutional:  Negative for activity change, appetite change, fatigue and fever.   HENT:  Negative for congestion.    Respiratory:  Negative for cough, choking and chest tightness.    Cardiovascular:  Negative for chest pain, palpitations and leg swelling.   Musculoskeletal:  Negative for arthralgias, back pain and gait problem.   Skin:  Negative for color change and pallor.   Neurological:  Negative for dizziness, facial asymmetry, light-headedness and headaches.       Objective   Vitals:  /82 (BP Location: Left arm, Patient Position:  "Sitting)   Pulse 82   Ht 1.638 m (5' 4.5\")   Wt 63.3 kg (139 lb 9.6 oz)   BMI 23.59 kg/m²       Physical Exam  Constitutional:       General: She is not in acute distress.     Appearance: Normal appearance. She is not toxic-appearing.   HENT:      Head: Normocephalic.      Right Ear: Tympanic membrane, ear canal and external ear normal.      Left Ear: Tympanic membrane, ear canal and external ear normal.   Eyes:      Conjunctiva/sclera: Conjunctivae normal.      Pupils: Pupils are equal, round, and reactive to light.   Cardiovascular:      Rate and Rhythm: Normal rate and regular rhythm.      Pulses: Normal pulses.      Heart sounds: Normal heart sounds.   Pulmonary:      Effort: No respiratory distress.      Breath sounds: No wheezing, rhonchi or rales.   Abdominal:      General: Bowel sounds are normal. There is no distension.      Palpations: Abdomen is soft.      Tenderness: There is no abdominal tenderness.   Musculoskeletal:         General: No swelling or tenderness.   Skin:     Findings: No lesion or rash.   Neurological:      General: No focal deficit present.      Mental Status: She is alert and oriented to person, place, and time. Mental status is at baseline.      Gait: Gait normal.   Psychiatric:         Mood and Affect: Mood normal.         Behavior: Behavior normal.         Thought Content: Thought content normal.         Judgment: Judgment normal.         Assessment/Plan   Problem List Items Addressed This Visit       Iron deficiency anemia    Osteoporosis    Relevant Orders    Follow Up In [facility] - Pharmacy    Cobalamin deficiency    Vitamin D deficiency    Elevated blood pressure reading    Overview     Last Assessment & Plan: Formatting of this note might be different from the original. Start bp diary at home, recheck in 2 weeks, call if consistently over 140/90 or readings in 100s diastolic, call         Anxiety     Other Visit Diagnoses       Healthcare maintenance    -  " Primary    Screening for cardiovascular condition        Relevant Orders    ECG 12 lead    Encounter for screening mammogram for breast cancer        Screening for colorectal cancer        Routine general medical examination at health care facility        Relevant Orders    1 Year Follow Up In [facility] - PCP - Wellness Exam        1. Patient's blood work unavailable at this office visit  2. Patient's LDL goal less than 130, current LDL unavailable  3. Patient's TG goal <150, current TG unavailable, continue TYPE IV diet  4. Patient to continue on B-12 injections, patient performs these at home since she lives in [place]  5. Mammogram negative 5-2024  6. DEXA 2023 osteoporosis due 2025  7. Colonoscopy 2022 due 2027  8. Patient to call if questions or concerns

## 2024-07-30 ENCOUNTER — SPECIALTY PHARMACY (OUTPATIENT)
Dept: PHARMACY | Facility: CLINIC | Age: 64
End: 2024-07-30

## 2024-07-30 ENCOUNTER — TELEMEDICINE (OUTPATIENT)
Dept: PHARMACY | Facility: HOSPITAL | Age: 64
End: 2024-07-30
Payer: MEDICARE

## 2024-07-30 DIAGNOSIS — M81.0 OSTEOPOROSIS, UNSPECIFIED OSTEOPOROSIS TYPE, UNSPECIFIED PATHOLOGICAL FRACTURE PRESENCE: ICD-10-CM

## 2024-07-30 PROCEDURE — RXMED WILLOW AMBULATORY MEDICATION CHARGE

## 2024-07-30 RX ORDER — DENOSUMAB 60 MG/ML
60 INJECTION SUBCUTANEOUS
Qty: 1 ML | Refills: 1 | Status: SHIPPED | OUTPATIENT
Start: 2024-07-30

## 2024-07-30 NOTE — PROGRESS NOTES
Pharmacist Clinic  07/30/24     Panda Valdes  was referred to the Clinical Pharmacy Team for her osteoporosis management and Prolia initiation.    Referring Provider: Marcella Bernard DO   _______________________________________________________________________  OSTEOPOROSIS ASSESSMENT from 3-1-23    FINDINGS:   Bone densitometry has been performed using a Hologic bone densitometer. This evaluation includes the lumbar spine and both hips.     Current exam:     Lumbar Spine: L1-L4   Bone mineral density (gm/cm2): 0.737, T-score: -2.8, Z-score: -1.1, This qualifies as osteoporosis.     Femoral Neck Left:   Bone mineral density (gm/cm2): 0.576, T-score: -2.5, Z-score: -0.9, This qualifies as osteopenia.     Femoral Neck Right:   Bone mineral density (gm/cm2): 0.609, T-score: -2.2, Z-score: -0.6, This qualifies as osteopenia.     Total Hip Left:   Bone mineral density (gm/cm2): 0.669, T-score: -2.2, Z-score: -1.0, This qualifies as osteopenia.     Total Hip Right:   Bone mineral density (gm/cm2): 0.702, T-score: -2.0, Z-score: -0.7, This qualifies as osteopenia.     Prior exam:     Lumbar Spine: L1-L4   Bone mineral density (gm/cm2): 0.746, T-score: -2.7, Z-score: -1.1, This qualifies as osteoporosis.     Femoral Neck Left:   Bone mineral density (gm/cm2): 0.575, T-score: -2.5, Z-score: -1.1, This qualifies as osteopenia.     Femoral Neck Right:   Bone mineral density (gm/cm2): 0.626, T-score: -2.0, Z-score: -0.6, This qualifies as osteopenia.     Total Hip Left:   Bone mineral density (gm/cm2): 0.671, T-score: -2.2, Z-score: -1.1, This qualifies as osteopenia.     Total Hip Right:   Bone mineral density (gm/cm2): 0.680, T-score: -2.1, Z-score: -1.0, This qualifies as osteopenia.     ADDITIONAL INFORMATION  Calcium/Vitamin D therapy: Vitamin D supplement only, recommended Calcium 1200mg to be added on  _______________________________________________________________________  PATIENT EDUCATION/DISCUSSION  - Educated  patient on Prolia dosing, common SE, anticipated response to therapy, benefits of therapy, and duration of therapy (long term unless transition to alternative agent for osteoporosis management)  - Discussed with patient the administration plan and next steps to expect  - Encouraged regular weight bearing physical exercise  _______________________________________________________________________  RECOMMENDATIONS/PLAN  1. START Prolia 60 mg under the skin once every 6 months.   2. START Calcium and Vitamin D supplement  3. Prescription to  Specialty Pharmacy for assistance with coverage and medication will be delivered to office for administration.   4. Patient to complete CMP within 30 days prior to Prolia injection.     Clinical Pharmacist follow-up:   Type of Encounter: Virtual    Thank you,  Marcella Krause, PharmD    Continue all meds under the continuation of care with the referring provider and clinical pharmacy team.

## 2024-08-01 ENCOUNTER — SPECIALTY PHARMACY (OUTPATIENT)
Dept: PHARMACY | Facility: CLINIC | Age: 64
End: 2024-08-01

## 2024-08-02 ENCOUNTER — SPECIALTY PHARMACY (OUTPATIENT)
Dept: PHARMACY | Facility: CLINIC | Age: 64
End: 2024-08-02

## 2024-08-06 ENCOUNTER — PHARMACY VISIT (OUTPATIENT)
Dept: PHARMACY | Facility: CLINIC | Age: 64
End: 2024-08-06
Payer: MEDICARE

## 2024-08-16 ENCOUNTER — HOSPITAL ENCOUNTER (EMERGENCY)
Age: 64
Discharge: HOME | End: 2024-08-16
Payer: MEDICARE

## 2024-08-16 ENCOUNTER — TELEPHONE (OUTPATIENT)
Dept: PRIMARY CARE | Facility: CLINIC | Age: 64
End: 2024-08-16
Payer: MEDICARE

## 2024-08-16 VITALS
SYSTOLIC BLOOD PRESSURE: 167 MMHG | OXYGEN SATURATION: 98 % | DIASTOLIC BLOOD PRESSURE: 100 MMHG | TEMPERATURE: 98.7 F | WEIGHT: 140 LBS | HEART RATE: 68 BPM | RESPIRATION RATE: 20 BRPM | BODY MASS INDEX: 23.3 KG/M2

## 2024-08-16 DIAGNOSIS — R03.0 ELEVATED BLOOD PRESSURE READING: ICD-10-CM

## 2024-08-16 DIAGNOSIS — R42 VERTIGO: Primary | ICD-10-CM

## 2024-08-16 PROCEDURE — 99213 OFFICE O/P EST LOW 20 MIN: CPT

## 2024-08-16 PROCEDURE — 99213 OFFICE O/P EST LOW 20 MIN: CPT | Performed by: EMERGENCY MEDICINE

## 2024-08-16 PROCEDURE — 93005 ELECTROCARDIOGRAM TRACING: CPT | Performed by: EMERGENCY MEDICINE

## 2024-08-16 PROCEDURE — 6370000000 HC RX 637 (ALT 250 FOR IP): Performed by: EMERGENCY MEDICINE

## 2024-08-16 RX ORDER — MECLIZINE HCL 25MG 25 MG/1
50 TABLET, CHEWABLE ORAL ONCE
Status: COMPLETED | OUTPATIENT
Start: 2024-08-16 | End: 2024-08-16

## 2024-08-16 RX ORDER — MECLIZINE HYDROCHLORIDE 25 MG/1
25 TABLET ORAL 3 TIMES DAILY PRN
Qty: 15 TABLET | Refills: 0 | Status: SHIPPED | OUTPATIENT
Start: 2024-08-16 | End: 2024-08-20

## 2024-08-16 RX ADMIN — MECLIZINE HYDROCHLORIDE 50 MG: 25 TABLET, CHEWABLE ORAL at 15:27

## 2024-08-16 ASSESSMENT — ENCOUNTER SYMPTOMS
SINUS PAIN: 0
RHINORRHEA: 0
PHOTOPHOBIA: 0
EYE REDNESS: 0
VOMITING: 0
FACIAL SWELLING: 0
SINUS PRESSURE: 0
EYE DISCHARGE: 0
EYE PAIN: 0
SHORTNESS OF BREATH: 0
COUGH: 0
NAUSEA: 0

## 2024-08-16 ASSESSMENT — VISUAL ACUITY: OU: 1

## 2024-08-16 ASSESSMENT — PAIN - FUNCTIONAL ASSESSMENT: PAIN_FUNCTIONAL_ASSESSMENT: NONE - DENIES PAIN

## 2024-08-16 NOTE — DISCHARGE INSTRUCTIONS
Trial of meclizine to see if this helps with your symptoms    Change positions slowly.  Do not drive if you are dizzy    You may try the Epley maneuver at home.  See instruction sheet.  You may also see local chiropractor to assist with this maneuver    Keep your appointment with your primary care provider on Tuesday next week.  Take your blood pressure in the morning and evening and keep a log of your blood pressure readings and bring with you to the appointment    Go to the emergency department for the development of any headache, worsening dizziness, vomiting, worsening visual disturbance, weakness to any part of your body or any new concerns

## 2024-08-16 NOTE — ED TRIAGE NOTES
Patient to room with c/o dizziness and inability to focus eyes when reading beginning two days ago. Decreased dizziness when eyes closed. EKG completed. Denies chest pain. Denies shortness of breath. Denies headache.

## 2024-08-16 NOTE — ED PROVIDER NOTES
[facility] URGENT CARE  Urgent Care Encounter       CHIEF COMPLAINT       Chief Complaint   Patient presents with    Dizziness       Nurses Notes reviewed and I agree except as noted in the HPI.  HISTORY OF PRESENT ILLNESS   Easton Montoya is a 64 y.o. female who presents for dizziness and off-balance.  Symptoms began 2 days ago while shopping.  She states when she closes her eyes or lies down, the symptoms will often improve.  When she stands up, sometimes the dizziness worsens.  She has had vertigo 1 time in the past.  She has not taken anything for her symptoms.  Patient reports that she took her blood pressure 2 days ago when her symptoms began with a blood pressure reading of 135/80.  She denies palpitations.  No headaches.  No nausea or vomiting.  No ear pain, hearing loss or drainage from the ears.  Patient does wear hearing aids and have had new hearing aids placed in the last month.    Patient does have a history of anxiety.  She states last medication change was 2 months ago.  She has also had recent blood work by her primary care provider    HPI    REVIEW OF SYSTEMS     Review of Systems   Constitutional:  Negative for activity change, chills, fatigue and fever.   HENT:  Negative for congestion, ear pain, facial swelling, rhinorrhea, sinus pressure, sinus pain and tinnitus.    Eyes:  Positive for visual disturbance. Negative for photophobia, pain, discharge and redness.   Respiratory:  Negative for cough and shortness of breath.    Cardiovascular:  Negative for chest pain and palpitations.   Gastrointestinal:  Negative for nausea and vomiting.   Neurological:  Positive for dizziness. Negative for syncope, weakness and headaches.       PAST MEDICAL HISTORY         Diagnosis Date    Anxiety     B12 deficiency     Depression     Hypertension     Iron deficiency     Osteoporosis        SURGICALHISTORY     Patient  has a past surgical history that includes Breast biopsy (Right, 1996) and US    General: She is not in acute distress.     Appearance: She is normal weight.   HENT:      Right Ear: Tympanic membrane, ear canal and external ear normal.      Left Ear: Tympanic membrane, ear canal and external ear normal.      Mouth/Throat:      Mouth: Mucous membranes are moist.      Pharynx: Oropharynx is clear. No oropharyngeal exudate.   Eyes:      General: Vision grossly intact.      Extraocular Movements: Extraocular movements intact.      Right eye: No nystagmus.      Left eye: No nystagmus.      Conjunctiva/sclera: Conjunctivae normal.      Pupils: Pupils are equal, round, and reactive to light.   Cardiovascular:      Rate and Rhythm: Normal rate and regular rhythm.      Pulses: Normal pulses.      Heart sounds: Normal heart sounds.   Pulmonary:      Effort: Pulmonary effort is normal.      Breath sounds: Normal breath sounds.   Abdominal:      General: Abdomen is flat. Bowel sounds are normal.      Palpations: Abdomen is soft.      Tenderness: There is no abdominal tenderness.   Musculoskeletal:      Cervical back: Normal range of motion.   Skin:     General: Skin is warm and dry.      Capillary Refill: Capillary refill takes less than 2 seconds.   Neurological:      Mental Status: She is alert.         DIAGNOSTIC RESULTS     Labs:No results found for this visit on 08/16/24.    IMAGING:    No orders to display         EKG:  Normal sinus rhythm ventricular rate 66 bpm.  MT interval 164, QRS 88, corrected  ms.  No ST elevation or depression concerning for ischemia or infarction    URGENT CARE COURSE:     Vitals:    08/16/24 1512 08/16/24 1513 08/16/24 1607   BP: (!) 164/103 (!) 165/105 (!) 167/100   Pulse: 68     Resp: 20  20   Temp: 98.7 °F (37.1 °C)     TempSrc: Temporal     SpO2: 98%  98%   Weight: 63.5 kg (140 lb)         Medications   meclizine (ANTIVERT) chewable tablet 50 mg (50 mg Oral Given 8/16/24 1527)            PROCEDURES:  None    FINAL IMPRESSION      1. Vertigo    2. Elevated blood

## 2024-08-17 LAB
EKG ATRIAL RATE: 66 BPM
EKG P AXIS: 66 DEGREES
EKG P-R INTERVAL: 164 MS
EKG Q-T INTERVAL: 452 MS
EKG QRS DURATION: 88 MS
EKG QTC CALCULATION (BAZETT): 473 MS
EKG R AXIS: -22 DEGREES
EKG T AXIS: 31 DEGREES
EKG VENTRICULAR RATE: 66 BPM

## 2024-08-17 PROCEDURE — 93010 ELECTROCARDIOGRAM REPORT: CPT | Performed by: INTERNAL MEDICINE

## 2024-08-19 ASSESSMENT — PATIENT HEALTH QUESTIONNAIRE - PHQ9
6. FEELING BAD ABOUT YOURSELF - OR THAT YOU ARE A FAILURE OR HAVE LET YOURSELF OR YOUR FAMILY DOWN: NOT AT ALL
8. MOVING OR SPEAKING SO SLOWLY THAT OTHER PEOPLE COULD HAVE NOTICED. OR THE OPPOSITE - BEING SO FIDGETY OR RESTLESS THAT YOU HAVE BEEN MOVING AROUND A LOT MORE THAN USUAL: NOT AT ALL
7. TROUBLE CONCENTRATING ON THINGS, SUCH AS READING THE NEWSPAPER OR WATCHING TELEVISION: NOT AT ALL
SUM OF ALL RESPONSES TO PHQ QUESTIONS 1-9: 4
10. IF YOU CHECKED OFF ANY PROBLEMS, HOW DIFFICULT HAVE THESE PROBLEMS MADE IT FOR YOU TO DO YOUR WORK, TAKE CARE OF THINGS AT HOME, OR GET ALONG WITH OTHER PEOPLE: SOMEWHAT DIFFICULT
SUM OF ALL RESPONSES TO PHQ QUESTIONS 1-9: 4
4. FEELING TIRED OR HAVING LITTLE ENERGY: SEVERAL DAYS
1. LITTLE INTEREST OR PLEASURE IN DOING THINGS: SEVERAL DAYS
8. MOVING OR SPEAKING SO SLOWLY THAT OTHER PEOPLE COULD HAVE NOTICED. OR THE OPPOSITE, BEING SO FIGETY OR RESTLESS THAT YOU HAVE BEEN MOVING AROUND A LOT MORE THAN USUAL: NOT AT ALL
9. THOUGHTS THAT YOU WOULD BE BETTER OFF DEAD, OR OF HURTING YOURSELF: NOT AT ALL
3. TROUBLE FALLING OR STAYING ASLEEP: SEVERAL DAYS
4. FEELING TIRED OR HAVING LITTLE ENERGY: SEVERAL DAYS
2. FEELING DOWN, DEPRESSED OR HOPELESS: SEVERAL DAYS
3. TROUBLE FALLING OR STAYING ASLEEP: SEVERAL DAYS
SUM OF ALL RESPONSES TO PHQ QUESTIONS 1-9: 4
5. POOR APPETITE OR OVEREATING: NOT AT ALL
SUM OF ALL RESPONSES TO PHQ QUESTIONS 1-9: 4
6. FEELING BAD ABOUT YOURSELF - OR THAT YOU ARE A FAILURE OR HAVE LET YOURSELF OR YOUR FAMILY DOWN: NOT AT ALL
9. THOUGHTS THAT YOU WOULD BE BETTER OFF DEAD, OR OF HURTING YOURSELF: NOT AT ALL
SUM OF ALL RESPONSES TO PHQ9 QUESTIONS 1 & 2: 2
5. POOR APPETITE OR OVEREATING: NOT AT ALL
SUM OF ALL RESPONSES TO PHQ QUESTIONS 1-9: 4
1. LITTLE INTEREST OR PLEASURE IN DOING THINGS: SEVERAL DAYS
10. IF YOU CHECKED OFF ANY PROBLEMS, HOW DIFFICULT HAVE THESE PROBLEMS MADE IT FOR YOU TO DO YOUR WORK, TAKE CARE OF THINGS AT HOME, OR GET ALONG WITH OTHER PEOPLE: SOMEWHAT DIFFICULT
7. TROUBLE CONCENTRATING ON THINGS, SUCH AS READING THE NEWSPAPER OR WATCHING TELEVISION: NOT AT ALL

## 2024-08-20 ENCOUNTER — OFFICE VISIT (OUTPATIENT)
Dept: FAMILY MEDICINE CLINIC | Age: 64
End: 2024-08-20
Payer: MEDICARE

## 2024-08-20 VITALS
BODY MASS INDEX: 23.26 KG/M2 | HEART RATE: 79 BPM | HEIGHT: 65 IN | OXYGEN SATURATION: 97 % | SYSTOLIC BLOOD PRESSURE: 136 MMHG | WEIGHT: 139.6 LBS | TEMPERATURE: 98.6 F | DIASTOLIC BLOOD PRESSURE: 94 MMHG | RESPIRATION RATE: 14 BRPM

## 2024-08-20 DIAGNOSIS — I10 HYPERTENSION, UNSPECIFIED TYPE: ICD-10-CM

## 2024-08-20 DIAGNOSIS — F41.1 GAD (GENERALIZED ANXIETY DISORDER): ICD-10-CM

## 2024-08-20 DIAGNOSIS — R26.89 IMBALANCE: Primary | ICD-10-CM

## 2024-08-20 DIAGNOSIS — H81.13 BENIGN PAROXYSMAL POSITIONAL VERTIGO DUE TO BILATERAL VESTIBULAR DISORDER: ICD-10-CM

## 2024-08-20 PROBLEM — N95.2 ATROPHIC VAGINITIS: Status: ACTIVE | Noted: 2024-05-23

## 2024-08-20 PROBLEM — R31.29 MICROSCOPIC HEMATURIA: Status: ACTIVE | Noted: 2024-06-09

## 2024-08-20 PROBLEM — F41.9 ANXIETY: Status: ACTIVE | Noted: 2024-06-09

## 2024-08-20 PROBLEM — R92.2 INCONCLUSIVE MAMMOGRAM: Status: ACTIVE | Noted: 2023-03-23

## 2024-08-20 PROCEDURE — 3080F DIAST BP >= 90 MM HG: CPT | Performed by: NURSE PRACTITIONER

## 2024-08-20 PROCEDURE — 99214 OFFICE O/P EST MOD 30 MIN: CPT | Performed by: NURSE PRACTITIONER

## 2024-08-20 PROCEDURE — 3075F SYST BP GE 130 - 139MM HG: CPT | Performed by: NURSE PRACTITIONER

## 2024-08-20 PROCEDURE — 1123F ACP DISCUSS/DSCN MKR DOCD: CPT | Performed by: NURSE PRACTITIONER

## 2024-08-20 RX ORDER — LISINOPRIL 10 MG/1
10 TABLET ORAL DAILY
Qty: 30 TABLET | Refills: 5 | Status: SHIPPED | OUTPATIENT
Start: 2024-08-20

## 2024-08-20 NOTE — PROGRESS NOTES
Easton Montoya is a 64 y.o. female thatpresents for 6 Month Follow-Up      History obtained today from Patient.  Recently saw PCP back at home too?  HPI    HTN    Checking at home 130s/ 90s  Was getting dizzy and having imbalance so went to urgent care last week  167/100 in urgent care   Stopped lisinopril a few months ago  Was getting ok results at home so stopped, was 120/82 without lisinopril  Saw PCP in July back home  Lipid elevated, triglycerides were in 200s.  Discussed diet changes  No headaches  Dizziness is better, was having it for 3 days prior to urgent care visit  On Antivert and that helped  Previous PCP recommend PT for vestibular therapy/imbalance      Fatigue  Doing 100 mg SR of Wellbutrin BID  Sometimes helps sometimes not  Depression/anxiety- seeing counselor  Tried hormone replacement for 7 months with Savita Nevarez unsure if it helped  Didn't feel any improvement  Felt more depressed  Still with fatigue even off the hormones    Depression  Follows with psychiatry    Mammogram 5/2024  DEXA 2023 osteoporosis due 2025  Colonoscopy 2022 due 2027      I have reviewed the patient's past medical history, past surgical history, allergies,medications, social and family history and I have made updates where appropriate.    Past Medical History:   Diagnosis Date    Anxiety     B12 deficiency     Depression     Hypertension     Iron deficiency     Osteoporosis        Social History     Tobacco Use    Smoking status: Never    Smokeless tobacco: Never   Vaping Use    Vaping status: Never Used   Substance Use Topics    Alcohol use: Yes     Comment: Occasionally 3 glasses, Denies issues with binge drinking or alcohol abuse    Drug use: Yes     Comment: gummies on occasion       Family History   Problem Relation Age of Onset    Cancer Mother         kidney cancer    No Known Problems Father     No Known Problems Brother     No Known Problems Brother     No Known Problems Brother     No Known Problems Maternal

## 2024-08-20 NOTE — PATIENT INSTRUCTIONS
[facility]   Address: [address], [place] [address]  Phone: [phone]        [facility]  Address: [address], [place] [address]  Phone: [phone]

## 2024-08-21 ENCOUNTER — TELEPHONE (OUTPATIENT)
Dept: PRIMARY CARE | Facility: CLINIC | Age: 64
End: 2024-08-21
Payer: MEDICARE

## 2024-08-21 ASSESSMENT — ENCOUNTER SYMPTOMS: RESPIRATORY NEGATIVE: 1

## 2024-08-28 ENCOUNTER — APPOINTMENT (OUTPATIENT)
Dept: PRIMARY CARE | Facility: CLINIC | Age: 64
End: 2024-08-28
Payer: MEDICARE

## 2024-09-04 ENCOUNTER — APPOINTMENT (OUTPATIENT)
Dept: PRIMARY CARE | Facility: CLINIC | Age: 64
End: 2024-09-04
Payer: MEDICARE

## 2024-09-10 ENCOUNTER — HOSPITAL ENCOUNTER (OUTPATIENT)
Dept: PHYSICAL THERAPY | Age: 64
Setting detail: THERAPIES SERIES
Discharge: HOME | End: 2024-09-10
Payer: MEDICARE

## 2024-09-10 PROCEDURE — 97162 PT EVAL MOD COMPLEX 30 MIN: CPT

## 2024-09-10 PROCEDURE — 97110 THERAPEUTIC EXERCISES: CPT

## 2024-09-13 RX ORDER — BUPROPION HYDROCHLORIDE 100 MG/1
100 TABLET, EXTENDED RELEASE ORAL 2 TIMES DAILY
Qty: 180 TABLET | Refills: 1 | OUTPATIENT
Start: 2024-09-13

## 2024-09-17 ENCOUNTER — TELEMEDICINE (OUTPATIENT)
Dept: PSYCHIATRY | Age: 64
End: 2024-09-17
Payer: MEDICARE

## 2024-09-17 ENCOUNTER — APPOINTMENT (OUTPATIENT)
Dept: PHYSICAL THERAPY | Age: 64
End: 2024-09-17
Payer: MEDICARE

## 2024-09-17 DIAGNOSIS — F43.0 ACUTE REACTION TO STRESS: ICD-10-CM

## 2024-09-17 DIAGNOSIS — G47.00 INSOMNIA, UNSPECIFIED TYPE: ICD-10-CM

## 2024-09-17 DIAGNOSIS — F41.1 GENERALIZED ANXIETY DISORDER: ICD-10-CM

## 2024-09-17 DIAGNOSIS — F34.1 PERSISTENT DEPRESSIVE DISORDER: Primary | ICD-10-CM

## 2024-09-17 PROCEDURE — 1123F ACP DISCUSS/DSCN MKR DOCD: CPT

## 2024-09-17 PROCEDURE — 99214 OFFICE O/P EST MOD 30 MIN: CPT

## 2024-09-17 RX ORDER — FLUOXETINE 40 MG/1
40 CAPSULE ORAL DAILY
Qty: 30 CAPSULE | Refills: 0 | Status: SHIPPED | OUTPATIENT
Start: 2024-09-17 | End: 2024-11-16

## 2024-09-17 RX ORDER — BUPROPION HYDROCHLORIDE 150 MG/1
150 TABLET ORAL EVERY MORNING
Qty: 30 TABLET | Refills: 0 | Status: SHIPPED | OUTPATIENT
Start: 2024-09-17

## 2024-09-18 ENCOUNTER — TELEPHONE (OUTPATIENT)
Dept: PSYCHIATRY | Age: 64
End: 2024-09-18

## 2024-09-19 ENCOUNTER — HOSPITAL ENCOUNTER (OUTPATIENT)
Dept: PHYSICAL THERAPY | Age: 64
Setting detail: THERAPIES SERIES
Discharge: HOME | End: 2024-09-19
Payer: MEDICARE

## 2024-09-19 PROCEDURE — 97112 NEUROMUSCULAR REEDUCATION: CPT

## 2024-09-24 PROBLEM — Z13.31 POSITIVE DEPRESSION SCREENING: Status: RESOLVED | Noted: 2019-02-18 | Resolved: 2024-09-24

## 2024-09-24 PROBLEM — F41.9 ANXIETY: Status: RESOLVED | Noted: 2024-06-09 | Resolved: 2024-09-24

## 2024-10-08 ENCOUNTER — APPOINTMENT (OUTPATIENT)
Dept: PHYSICAL THERAPY | Age: 64
End: 2024-10-08
Payer: MEDICARE

## 2024-10-09 NOTE — TELEPHONE ENCOUNTER
[facility] a medication refill on Easton's behalf for Wellbutrin XL 150mg;#30 with 0 refills; with a start and refill date of 09/17/24.    Medication is pending your approval for a 30 day supply with 0 refills; she is scheduled to return on 10/21/24 leaving her short of medication.  She was last seen on 09/17/24.

## 2024-10-10 ENCOUNTER — HOSPITAL ENCOUNTER (OUTPATIENT)
Dept: PHYSICAL THERAPY | Age: 64
Setting detail: THERAPIES SERIES
Discharge: HOME | End: 2024-10-10
Payer: MEDICARE

## 2024-10-10 PROCEDURE — 97112 NEUROMUSCULAR REEDUCATION: CPT

## 2024-10-10 RX ORDER — BUPROPION HYDROCHLORIDE 150 MG/1
150 TABLET ORAL EVERY MORNING
Qty: 30 TABLET | Refills: 0 | Status: SHIPPED | OUTPATIENT
Start: 2024-10-10

## 2024-10-10 NOTE — PROGRESS NOTES
[facility]  PHYSICAL THERAPY  [] EVALUATION  [x] DAILY NOTE (LAND) [] DAILY NOTE (AQUATIC ) [] PROGRESS NOTE [] DISCHARGE NOTE    [x] OUTPATIENT REHABILITATION CENTER [facility]   [] [facility] [place]    [] [facility]   [] [facility]    Date: 10/10/2024  Patient Name:  Easton Montoya  : 1960  MRN: [id number]  CSN: [id number]    Referring Practitioner Jenae Geronimo, APRN - CNP [id number]      Diagnosis Other abnormalities of gait and mobility  Benign paroxysmal vertigo, bilateral   Treatment Diagnosis R42   Dizziness and giddiness  R26.89  Abnormalities of gait and mobility  R26.81  Unsteadiness on feet   Date of Evaluation 9/10/24   Additional Pertinent History Easton Montoya has a past medical history of Anxiety, B12 deficiency, Depression, Hypertension, Iron deficiency, and Osteoporosis.  she has a past surgical history that includes Breast biopsy (Right, ) and US BREAST BIOPSY W LOC DEVICE 1ST LESION LEFT (Left, ).     Allergies No Known Allergies   Medications   Current Outpatient Medications:     buPROPion (WELLBUTRIN XL) 150 MG extended release tablet, TAKE 1 TABLET BY MOUTH EVERY DAY IN THE MORNING, Disp: 30 tablet, Rfl: 0    FLUoxetine (PROZAC) 40 MG capsule, Take 1 capsule by mouth daily, Disp: 30 capsule, Rfl: 0    FLUoxetine (PROZAC) 20 MG capsule, Take 1 capsule by mouth daily, Disp: 30 capsule, Rfl: 0    lisinopril (PRINIVIL;ZESTRIL) 10 MG tablet, Take 1 tablet by mouth daily, Disp: 30 tablet, Rfl: 5    denosumab (PROLIA) 60 MG/ML SOSY SC injection, Inject into the skin, Disp: , Rfl:     vitamin D 1000 units CAPS, Take 5 capsules by mouth Vitamin D3, Disp: , Rfl:     ferrous sulfate 325 (65 Fe) MG tablet, Take 65 mg by mouth, Disp: , Rfl:     Multiple Vitamins-Minerals (MULTIVITAMIN ADULT PO), multivitamin, Disp: , Rfl:     Cyanocobalamin 1000 MCG/ML KIT, vitamin B12-folic acid injection solution, Disp: , Rfl:       Functional

## 2024-10-15 ENCOUNTER — HOSPITAL ENCOUNTER (EMERGENCY)
Age: 64
Discharge: HOME | End: 2024-10-15
Payer: MEDICARE

## 2024-10-15 ENCOUNTER — HOSPITAL ENCOUNTER (OUTPATIENT)
Dept: PHYSICAL THERAPY | Age: 64
Setting detail: THERAPIES SERIES
End: 2024-10-15
Payer: MEDICARE

## 2024-10-15 VITALS
HEART RATE: 74 BPM | RESPIRATION RATE: 18 BRPM | SYSTOLIC BLOOD PRESSURE: 137 MMHG | DIASTOLIC BLOOD PRESSURE: 79 MMHG | OXYGEN SATURATION: 96 % | TEMPERATURE: 97.7 F

## 2024-10-15 DIAGNOSIS — M79.10 MYALGIA: ICD-10-CM

## 2024-10-15 DIAGNOSIS — T73.2XXA FATIGUE DUE TO EXPOSURE, INITIAL ENCOUNTER: Primary | ICD-10-CM

## 2024-10-15 LAB
FLUAV AG SPEC QL: NEGATIVE
FLUBV AG SPEC QL: NEGATIVE
SARS-COV-2 RDRP RESP QL NAA+PROBE: NOT  DETECTED

## 2024-10-15 PROCEDURE — 87635 SARS-COV-2 COVID-19 AMP PRB: CPT

## 2024-10-15 PROCEDURE — 99213 OFFICE O/P EST LOW 20 MIN: CPT

## 2024-10-15 PROCEDURE — 87804 INFLUENZA ASSAY W/OPTIC: CPT

## 2024-10-15 RX ORDER — AZITHROMYCIN 250 MG/1
TABLET, FILM COATED ORAL
Qty: 6 TABLET | Refills: 0 | Status: SHIPPED | OUTPATIENT
Start: 2024-10-15 | End: 2024-10-24

## 2024-10-15 ASSESSMENT — PAIN - FUNCTIONAL ASSESSMENT: PAIN_FUNCTIONAL_ASSESSMENT: 0-10

## 2024-10-15 ASSESSMENT — ENCOUNTER SYMPTOMS
RHINORRHEA: 0
SHORTNESS OF BREATH: 0
NAUSEA: 0
TROUBLE SWALLOWING: 0
EYE DISCHARGE: 0
VOMITING: 0
SORE THROAT: 0
DIARRHEA: 0
EYE REDNESS: 0
COUGH: 0

## 2024-10-15 ASSESSMENT — PAIN SCALES - GENERAL: PAINLEVEL_OUTOF10: 3

## 2024-10-15 ASSESSMENT — PAIN DESCRIPTION - LOCATION: LOCATION: HEAD

## 2024-10-15 NOTE — DISCHARGE INSTRUCTIONS
Prescribed azithromycin take as packaging instructs.  Take this medication until completed.    Recommend lots of fluids, vitamin C and zinc.    Rest.    Follow-up in 3 to 5 days if symptoms worsen or fail to improve.

## 2024-10-15 NOTE — ED TRIAGE NOTES
Feeling terrible , headache, muscles in legs sore , slight dizziness, since this last Friday, has not done a covid test, hard to move around, fatigued

## 2024-10-15 NOTE — ED PROVIDER NOTES
[facility] URGENT CARE  Urgent Care Encounter      CHIEF COMPLAINT       Chief Complaint   Patient presents with    Fatigue       Nurses Notes reviewed and I agree except as noted in the HPI.  HISTORY OF PRESENT ILLNESS   Easton Montoya is a 64 y.o. female who presents urgent care for evaluation of fatigue and generalized muscle aches.  Reports onset of symptoms on Friday.  Reports she just feels extremely tired and has muscle aches all over.  Denies any cough, runny nose, nausea vomiting or diarrhea.    REVIEW OF SYSTEMS     Review of Systems   Constitutional:  Positive for fatigue. Negative for chills, diaphoresis and fever.   HENT:  Negative for congestion, ear pain, rhinorrhea, sore throat and trouble swallowing.    Eyes:  Negative for discharge and redness.   Respiratory:  Negative for cough and shortness of breath.    Cardiovascular:  Negative for chest pain.   Gastrointestinal:  Negative for diarrhea, nausea and vomiting.   Genitourinary:  Negative for decreased urine volume.   Musculoskeletal:  Positive for myalgias. Negative for neck pain and neck stiffness.   Skin:  Negative for rash.   Neurological:  Negative for headaches.   Hematological:  Negative for adenopathy.   Psychiatric/Behavioral:  Negative for sleep disturbance.        PAST MEDICAL HISTORY         Diagnosis Date    Anxiety     B12 deficiency     Depression     Hypertension     Iron deficiency     Osteoporosis        SURGICAL HISTORY     Patient  has a past surgical history that includes Breast biopsy (Right, 1996) and US BREAST BIOPSY W LOC DEVICE 1ST LESION LEFT (Left, 2002).    CURRENT MEDICATIONS       Discharge Medication List as of 10/15/2024  6:15 PM        CONTINUE these medications which have NOT CHANGED    Details   buPROPion (WELLBUTRIN XL) 150 MG extended release tablet TAKE 1 TABLET BY MOUTH EVERY DAY IN THE MORNING, Disp-30 tablet, R-0Normal      !! FLUoxetine (PROZAC) 40 MG capsule Take 1 capsule by mouth daily,

## 2024-10-18 ENCOUNTER — OFFICE VISIT (OUTPATIENT)
Dept: FAMILY MEDICINE CLINIC | Age: 64
End: 2024-10-18

## 2024-10-18 VITALS
WEIGHT: 140.6 LBS | OXYGEN SATURATION: 100 % | RESPIRATION RATE: 12 BRPM | DIASTOLIC BLOOD PRESSURE: 86 MMHG | BODY MASS INDEX: 23.43 KG/M2 | TEMPERATURE: 96.9 F | SYSTOLIC BLOOD PRESSURE: 122 MMHG | HEART RATE: 68 BPM | HEIGHT: 65 IN

## 2024-10-18 DIAGNOSIS — M79.10 MYALGIA: Primary | ICD-10-CM

## 2024-10-18 DIAGNOSIS — R53.83 FATIGUE, UNSPECIFIED TYPE: ICD-10-CM

## 2024-10-18 DIAGNOSIS — R42 DIZZINESS: ICD-10-CM

## 2024-10-18 RX ORDER — PREDNISONE 20 MG/1
40 TABLET ORAL DAILY
Qty: 10 TABLET | Refills: 0 | Status: SHIPPED | OUTPATIENT
Start: 2024-10-18 | End: 2024-10-23

## 2024-10-18 SDOH — ECONOMIC STABILITY: FOOD INSECURITY: WITHIN THE PAST 12 MONTHS, THE FOOD YOU BOUGHT JUST DIDN'T LAST AND YOU DIDN'T HAVE MONEY TO GET MORE.: NEVER TRUE

## 2024-10-18 SDOH — ECONOMIC STABILITY: INCOME INSECURITY: HOW HARD IS IT FOR YOU TO PAY FOR THE VERY BASICS LIKE FOOD, HOUSING, MEDICAL CARE, AND HEATING?: NOT HARD AT ALL

## 2024-10-18 SDOH — ECONOMIC STABILITY: FOOD INSECURITY: WITHIN THE PAST 12 MONTHS, YOU WORRIED THAT YOUR FOOD WOULD RUN OUT BEFORE YOU GOT MONEY TO BUY MORE.: NEVER TRUE

## 2024-10-18 ASSESSMENT — ENCOUNTER SYMPTOMS
EYE REDNESS: 0
SORE THROAT: 0
ABDOMINAL PAIN: 0
COLOR CHANGE: 0
WHEEZING: 0
NAUSEA: 0
RHINORRHEA: 0
COUGH: 0
EYE PAIN: 0
TROUBLE SWALLOWING: 0
VOMITING: 0
SHORTNESS OF BREATH: 0
DIARRHEA: 0

## 2024-10-18 NOTE — PROGRESS NOTES
Negative for chills and diaphoresis.   HENT:  Negative for congestion, rhinorrhea, sore throat and trouble swallowing.    Eyes:  Negative for pain, redness and visual disturbance.   Respiratory:  Negative for cough, shortness of breath and wheezing.    Cardiovascular:  Negative for chest pain, palpitations and leg swelling.   Gastrointestinal:  Negative for abdominal pain, diarrhea, nausea and vomiting.   Endocrine: Negative for polydipsia, polyphagia and polyuria.   Genitourinary:  Negative for decreased urine volume, dysuria, frequency and urgency.   Musculoskeletal:  Positive for myalgias. Negative for arthralgias.   Skin:  Negative for color change and rash.   Allergic/Immunologic: Negative for environmental allergies, food allergies and immunocompromised state.   Neurological:  Positive for dizziness. Negative for tremors, syncope and headaches.   Hematological:  Negative for adenopathy.   Psychiatric/Behavioral:  Negative for behavioral problems, confusion, self-injury and suicidal ideas.    All other systems reviewed and are negative.                    PHYSICAL EXAM:  /86   Pulse 68   Temp 96.9 °F (36.1 °C) (Temporal)   Resp 12   Ht 1.651 m (5' 5\")   Wt 63.8 kg (140 lb 9.6 oz)   SpO2 100%   BMI 23.40 kg/m²     Physical Exam  Vitals and nursing note reviewed.   Constitutional:       General: She is not in acute distress.     Appearance: Normal appearance. She is well-developed. She is not ill-appearing or toxic-appearing.   HENT:      Head: Normocephalic and atraumatic.      Right Ear: Hearing, tympanic membrane, ear canal and external ear normal.      Left Ear: Hearing, tympanic membrane, ear canal and external ear normal.      Nose: Nose normal.      Right Sinus: No maxillary sinus tenderness or frontal sinus tenderness.      Left Sinus: No maxillary sinus tenderness or frontal sinus tenderness.      Mouth/Throat:      Pharynx: Uvula midline.   Eyes:      General: Lids are normal. Lids are

## 2024-10-22 RX ORDER — FLUOXETINE 40 MG/1
40 CAPSULE ORAL DAILY
Qty: 30 CAPSULE | Refills: 0 | Status: SHIPPED | OUTPATIENT
Start: 2024-10-22

## 2024-10-22 NOTE — TELEPHONE ENCOUNTER
[facility] PATIENT  [facility] refill on Easton's behalf for Prozac 20mg;#30 with 0 refills; last with a start and refill date of 09/17/24.    Medication is pending your approval for a 30 day supply with 0 refills. Per chart she is prescribed a 40mg dose as well. Both doses are pending. She is scheduled to return with [facility] on 10/29/24. Last seen on 09/17/24.

## 2024-10-23 ENCOUNTER — TELEPHONE (OUTPATIENT)
Dept: FAMILY MEDICINE CLINIC | Age: 64
End: 2024-10-23

## 2024-10-23 NOTE — TELEPHONE ENCOUNTER
Let Easton know overall her labs are stable and in appropriate ranges. Her labs do show that she has had mono (santiago barr virus) in the past, but it is not an acute infection. She has antibodies to it, so it is a prior infection. Otherwise labs are normal. I would go ahead and complete the course of prednisone, and then f/u with Eileen if symptoms are persisting.

## 2024-10-24 ENCOUNTER — OFFICE VISIT (OUTPATIENT)
Dept: FAMILY MEDICINE CLINIC | Age: 64
End: 2024-10-24

## 2024-10-24 VITALS
HEART RATE: 64 BPM | BODY MASS INDEX: 23.43 KG/M2 | RESPIRATION RATE: 14 BRPM | WEIGHT: 140.6 LBS | TEMPERATURE: 98.1 F | OXYGEN SATURATION: 99 % | SYSTOLIC BLOOD PRESSURE: 148 MMHG | HEIGHT: 65 IN | DIASTOLIC BLOOD PRESSURE: 82 MMHG

## 2024-10-24 DIAGNOSIS — R76.0 ELEVATED EBV ANTIBODY TITER: ICD-10-CM

## 2024-10-24 DIAGNOSIS — R53.83 OTHER FATIGUE: ICD-10-CM

## 2024-10-24 DIAGNOSIS — Z86.19 HISTORY OF EPSTEIN-BARR VIRUS INFECTION: Primary | ICD-10-CM

## 2024-10-24 DIAGNOSIS — R42 DIZZINESS: ICD-10-CM

## 2024-10-24 DIAGNOSIS — R68.89 ABNORMAL ANKLE BRACHIAL INDEX (ABI): ICD-10-CM

## 2024-10-24 DIAGNOSIS — R42 VERTIGO: ICD-10-CM

## 2024-10-24 DIAGNOSIS — M79.10 MYALGIA: ICD-10-CM

## 2024-10-24 DIAGNOSIS — R26.89 IMBALANCE: ICD-10-CM

## 2024-10-24 NOTE — PATIENT INSTRUCTIONS
Increase lisinopril 20 mg until Monday  Message me with blood pressure readings  Top number less than 110 , only do 10 mg of Lisinopril

## 2024-10-24 NOTE — PROGRESS NOTES
Easton Montoya is a 64 y.o. female thatpresents for Follow-up and Discuss Labs      History obtained today from Patient and     HPI      1 week follow up  Saw Leonard last week  Labs reassuring but pt and  with  concerns about her EBV IGg being very elevated.  Still having dizziness, tried therapy, last Thursday. Was dizzy after that and then felt worse after that, so for about 2 weeks now  When reading will get dizzy  Blurry vision  No syncopal episodes  Hard to get up and move around, felt like she was hit by a bus  Very tired  Legs hurt, upper thigh muscles are painful  Hard to walk around  No back pain  Antivert helped for a little bit  Had vertigo about 1 month ago in August  Somewhat better   Wears hearing aides  No tinnitus      Previous hx of EBV  Had mono in the past when she was in 20s  No hx of strep throat  No longer having low grade temps  Sleeping ok uses Melatonin  Naps sometimes      Last visit;     Developed vertigo about 1 month ago  Started doing PT, but it wasn't really helping  Started feeling sick about 1 week ago-went to the   Was tested for COVID and flu and was negative  Treated with Zpak which did nothing.  C/o feeling achy, fatigued, nauseated and intermittent blurry vision    +tremor bilateral arms  Abnormal heel to shin test  Couldn't keep left heel on right shin     Abnormal NATALIYA screening  Right leg  Was a home test I believe  Arterial US pending  Not scheduled  No significant claudication symptoms     Depression  On Wellbutrin and prozac 60 mg  Follows with psychiatry    Osteoporosis  Dexa 2023  [facility] with [facility]  Prolia  7/2024, DUE January    Breast cancer screening - 5/2024  Colon cancer screening - DUE 2027  Cervical cancer screening - HPV Due to repeat in 1 year 7/2025      I have reviewed the patient's past medical history, past surgical history, allergies,medications, social and family history and I have made updates where appropriate.    Past

## 2024-10-25 ENCOUNTER — HOSPITAL ENCOUNTER (OUTPATIENT)
Dept: INTERVENTIONAL RADIOLOGY/VASCULAR | Age: 64
Discharge: HOME | End: 2024-10-27
Payer: MEDICARE

## 2024-10-25 DIAGNOSIS — R68.89 ABNORMAL ANKLE BRACHIAL INDEX (ABI): ICD-10-CM

## 2024-10-25 DIAGNOSIS — I10 HYPERTENSION, UNSPECIFIED TYPE: ICD-10-CM

## 2024-10-25 DIAGNOSIS — I79.8 OTHER DISORDERS OF ARTERIES, ARTERIOLES AND CAPILLARIES IN DISEASES CLASSIFIED ELSEWHERE (HCC): ICD-10-CM

## 2024-10-25 PROCEDURE — 93926 LOWER EXTREMITY STUDY: CPT

## 2024-10-28 ENCOUNTER — TELEPHONE (OUTPATIENT)
Dept: FAMILY MEDICINE CLINIC | Age: 64
End: 2024-10-28

## 2024-10-28 DIAGNOSIS — I70.209 POPLITEAL ARTERY STENOSIS (HCC): ICD-10-CM

## 2024-10-28 DIAGNOSIS — R68.89 ABNORMAL ANKLE BRACHIAL INDEX (ABI): Primary | ICD-10-CM

## 2024-10-28 ASSESSMENT — ENCOUNTER SYMPTOMS
CHEST TIGHTNESS: 0
RHINORRHEA: 0
COUGH: 0
CHOKING: 0
SINUS PRESSURE: 0
EYES NEGATIVE: 1
SHORTNESS OF BREATH: 0
GASTROINTESTINAL NEGATIVE: 1
SINUS PAIN: 0
FACIAL SWELLING: 0

## 2024-10-28 NOTE — TELEPHONE ENCOUNTER
----- Message from LASHANDA Nova CNP sent at 10/27/2024  8:00 PM EDT -----  Mild narrowing of her popliteal artery on right lower leg. For further evaluation can offer a vascular referral if shed like.

## 2024-10-29 ENCOUNTER — TELEMEDICINE (OUTPATIENT)
Dept: PSYCHIATRY | Age: 64
End: 2024-10-29

## 2024-10-29 DIAGNOSIS — F41.1 GENERALIZED ANXIETY DISORDER: ICD-10-CM

## 2024-10-29 DIAGNOSIS — F43.0 ACUTE REACTION TO STRESS: ICD-10-CM

## 2024-10-29 DIAGNOSIS — F34.1 PERSISTENT DEPRESSIVE DISORDER: Primary | ICD-10-CM

## 2024-10-29 RX ORDER — BUPROPION HYDROCHLORIDE 150 MG/1
150 TABLET ORAL EVERY MORNING
Qty: 30 TABLET | Refills: 2 | Status: SHIPPED | OUTPATIENT
Start: 2024-10-29

## 2024-10-29 RX ORDER — FLUOXETINE 40 MG/1
40 CAPSULE ORAL DAILY
Qty: 30 CAPSULE | Refills: 1 | Status: SHIPPED | OUTPATIENT
Start: 2024-10-29

## 2024-10-29 NOTE — PROGRESS NOTES
QTc 440      Patient has been instructed to seek emergency help via the emergency and/or calling 911 should symptoms become severe, worsen, or with other concerning symptoms. Patient instructed to go immediately to the emergency room and/or call 911 with any suicidal or homicidal ideations or if audio/visual hallucinations develop.  Patient given crisis center information. Patient stated understanding and agrees.       Easton KASSANDRA Montoya, was evaluated through a synchronous (real-time) audio-video encounter. The patient (or guardian if applicable) is aware that this is a billable service, which includes applicable co-pays. This Virtual Visit was conducted with patient's (and/or legal guardian's) consent. Patient identification was verified, and a caregiver was present when appropriate.   The patient was located at Home: [address] [place] [address] [place] [address]  Provider was located at Home (Appt Dept State): [place]  Confirm you are appropriately licensed, registered, or certified to deliver care in the state where the patient is located as indicated above. If you are not or unsure, please re-schedule the visit: Yes, I confirm.                  Time spent with patient: not billed by time     Provider Signature:  Electronically signed by LASHANDA Patel CNP on 10/29/2024 at 1:47 PM    **This report has been created using voice recognition software. It may contain minor errors which are inherent in voice recognition technology.**

## 2024-10-29 NOTE — PATIENT INSTRUCTIONS
-Please take medications as prescribed  -Refrain from alcohol or drug use  -Seek emergency help via the emergency and/or calling 911 should symptoms become severe, worsen, or with other concerning symptoms.Go immediately to the emergency room and/or call 911 with any suicidal or homicidal ideations or if audio/visual hallucinations develop.   -Contact office with any questions or concerns.     Crisis phone numbers:  [facility] [phone].  [facility] [phone]  [facility] [phone].  [facility] [phone].  [facility] [phone].  [facility] [phone].

## 2024-10-31 ENCOUNTER — TELEPHONE (OUTPATIENT)
Dept: INFECTIOUS DISEASES | Age: 64
End: 2024-10-31

## 2024-10-31 NOTE — TELEPHONE ENCOUNTER
Called patient to schedule appointment for [facility] clinic per referral. No answer, left voicemail         Patient returned call with appointment on Monday, Nov. 25 @ 11:30.

## 2024-11-09 ENCOUNTER — HOSPITAL ENCOUNTER (OUTPATIENT)
Dept: MRI IMAGING | Age: 64
Discharge: HOME | End: 2024-11-09
Payer: MEDICARE

## 2024-11-09 DIAGNOSIS — M79.10 MYALGIA: ICD-10-CM

## 2024-11-09 DIAGNOSIS — R76.0 ELEVATED EBV ANTIBODY TITER: ICD-10-CM

## 2024-11-09 DIAGNOSIS — R26.89 IMBALANCE: ICD-10-CM

## 2024-11-09 DIAGNOSIS — R42 DIZZINESS: ICD-10-CM

## 2024-11-09 DIAGNOSIS — R42 VERTIGO: ICD-10-CM

## 2024-11-09 PROCEDURE — A9579 GAD-BASE MR CONTRAST NOS,1ML: HCPCS | Performed by: NURSE PRACTITIONER

## 2024-11-09 PROCEDURE — 6360000004 HC RX CONTRAST MEDICATION: Performed by: NURSE PRACTITIONER

## 2024-11-09 PROCEDURE — 70553 MRI BRAIN STEM W/O & W/DYE: CPT

## 2024-11-09 RX ADMIN — GADOTERIDOL 15 ML: 279.3 INJECTION, SOLUTION INTRAVENOUS at 10:13

## 2024-11-11 ENCOUNTER — TELEPHONE (OUTPATIENT)
Dept: FAMILY MEDICINE CLINIC | Age: 64
End: 2024-11-11

## 2024-11-11 NOTE — TELEPHONE ENCOUNTER
----- Message from LASHANDA Nova CNP sent at 11/10/2024 11:09 AM EST -----  Her MRI shows no acute abnormalities but does show some areas of concern with reduced blood flow in white matter. If she isn't opposed I would like her to see Neurologist for further evaluation and likely be someone out of town. Either [place] or [place] if she has preference.

## 2024-11-11 NOTE — TELEPHONE ENCOUNTER
Left message on answering machine. Requested pt to call back at [phone], at their earliest convenience.

## 2024-11-25 ENCOUNTER — OFFICE VISIT (OUTPATIENT)
Dept: INFECTIOUS DISEASES | Age: 64
End: 2024-11-25
Payer: MEDICARE

## 2024-11-25 ENCOUNTER — HOSPITAL ENCOUNTER (OUTPATIENT)
Age: 64
Discharge: HOME | End: 2024-11-25
Payer: MEDICARE

## 2024-11-25 VITALS
TEMPERATURE: 97.6 F | SYSTOLIC BLOOD PRESSURE: 114 MMHG | OXYGEN SATURATION: 98 % | DIASTOLIC BLOOD PRESSURE: 74 MMHG | HEART RATE: 72 BPM | HEIGHT: 65 IN | RESPIRATION RATE: 18 BRPM | WEIGHT: 138.2 LBS | BODY MASS INDEX: 23.03 KG/M2

## 2024-11-25 DIAGNOSIS — F41.1 GENERALIZED ANXIETY DISORDER: ICD-10-CM

## 2024-11-25 DIAGNOSIS — R87.610 ATYPICAL SQUAMOUS CELLS OF UNDETERMINED SIGNIFICANCE ON CYTOLOGIC SMEAR OF CERVIX (ASC-US): ICD-10-CM

## 2024-11-25 DIAGNOSIS — H81.13 BENIGN PAROXYSMAL POSITIONAL VERTIGO DUE TO BILATERAL VESTIBULAR DISORDER: ICD-10-CM

## 2024-11-25 DIAGNOSIS — D64.9 ANEMIA, UNSPECIFIED TYPE: ICD-10-CM

## 2024-11-25 DIAGNOSIS — N95.2 ATROPHIC VAGINITIS: ICD-10-CM

## 2024-11-25 DIAGNOSIS — M60.871: ICD-10-CM

## 2024-11-25 DIAGNOSIS — B27.90 CHRONIC EBV INFECTION: Primary | ICD-10-CM

## 2024-11-25 DIAGNOSIS — G47.00 INSOMNIA, UNSPECIFIED TYPE: ICD-10-CM

## 2024-11-25 DIAGNOSIS — B27.90 CHRONIC EBV INFECTION: ICD-10-CM

## 2024-11-25 PROCEDURE — 87798 DETECT AGENT NOS DNA AMP: CPT

## 2024-11-25 PROCEDURE — 1126F AMNT PAIN NOTED NONE PRSNT: CPT | Performed by: STUDENT IN AN ORGANIZED HEALTH CARE EDUCATION/TRAINING PROGRAM

## 2024-11-25 PROCEDURE — 1123F ACP DISCUSS/DSCN MKR DOCD: CPT | Performed by: STUDENT IN AN ORGANIZED HEALTH CARE EDUCATION/TRAINING PROGRAM

## 2024-11-25 PROCEDURE — 99204 OFFICE O/P NEW MOD 45 MIN: CPT | Performed by: STUDENT IN AN ORGANIZED HEALTH CARE EDUCATION/TRAINING PROGRAM

## 2024-11-25 PROCEDURE — 3074F SYST BP LT 130 MM HG: CPT | Performed by: STUDENT IN AN ORGANIZED HEALTH CARE EDUCATION/TRAINING PROGRAM

## 2024-11-25 PROCEDURE — 3078F DIAST BP <80 MM HG: CPT | Performed by: STUDENT IN AN ORGANIZED HEALTH CARE EDUCATION/TRAINING PROGRAM

## 2024-11-25 PROCEDURE — 36415 COLL VENOUS BLD VENIPUNCTURE: CPT

## 2024-11-25 RX ORDER — HYDROCHLOROTHIAZIDE 12.5 MG/1
12.5 CAPSULE ORAL DAILY
COMMUNITY
End: 2024-11-25

## 2024-11-25 RX ORDER — LISINOPRIL AND HYDROCHLOROTHIAZIDE 10; 12.5 MG/1; MG/1
1 TABLET ORAL DAILY
COMMUNITY
End: 2024-12-06 | Stop reason: SDUPTHER

## 2024-11-25 NOTE — PROGRESS NOTES
Progress note: Infectious diseases    Patient - Easton Montoya  Age - 64 y.o.      - 1960      Date- 2024        ASSESSMENT/PLAN   1. Benign paroxysmal positional vertigo due to bilateral vestibular disorder  Chronic EBV infection    Patient is a 64-year-old female who presents with multiple symptoms that have been ongoing for several months.  She has a reported history of infectious mono approximately 40 years prior with positive serologies in the post infection setting.  In my opinion, this is unlikely to be the cause of fatigue and dizzy episodes.  I would recommend age-appropriate cancer screening.  I have reviewed her other labs and prior workup.    Do not recommend further treatment of EBV at this time, this is more consistent with prior infection  Recommend age-appropriate cancer screening for this patient    2. Anemia, unspecified type      3. Atypical squamous cells of undetermined significance on cytologic smear of cervix (ASC-US)      4. Generalized anxiety disorder      5. Insomnia, unspecified type      6. Atrophic vaginitis          SUBJECTIVE:     Patient is a 64-year-old female who I am consulted for positive EBV serologies in the setting of chronic fatigue and episodes of dizziness.  Infectious disease consulted for further recommendations.    Patient does report a prior history of infectious mononucleosis nearly 40 years prior.  Patient has undergone a fairly extensive workup for chronic causes of fatigue and nonspecific systemic symptoms.  Rheumatologic workup unremarkable.  EBV serologies consistent with prior infection or exposure.  Patient has completed age-appropriate cancer screening though it does appear she is due for a colonoscopy based on my EMR review.  She has no history of IV drug usage, tobacco use or alcohol abuse.  General inflammatory markers negative.  I reviewed MRI

## 2024-11-28 LAB — EBV DNA SPEC QL NAA+PROBE: NORMAL

## 2024-12-04 ENCOUNTER — TELEMEDICINE (OUTPATIENT)
Dept: PSYCHIATRY | Age: 64
End: 2024-12-04
Payer: MEDICARE

## 2024-12-04 DIAGNOSIS — F34.1 PERSISTENT DEPRESSIVE DISORDER: Primary | ICD-10-CM

## 2024-12-04 DIAGNOSIS — F41.1 GENERALIZED ANXIETY DISORDER: ICD-10-CM

## 2024-12-04 DIAGNOSIS — G47.00 INSOMNIA, UNSPECIFIED TYPE: ICD-10-CM

## 2024-12-04 DIAGNOSIS — F43.0 ACUTE REACTION TO STRESS: ICD-10-CM

## 2024-12-04 PROCEDURE — 1159F MED LIST DOCD IN RCRD: CPT

## 2024-12-04 PROCEDURE — 1160F RVW MEDS BY RX/DR IN RCRD: CPT

## 2024-12-04 PROCEDURE — 1123F ACP DISCUSS/DSCN MKR DOCD: CPT

## 2024-12-04 PROCEDURE — 99214 OFFICE O/P EST MOD 30 MIN: CPT

## 2024-12-04 RX ORDER — BUPROPION HYDROCHLORIDE 150 MG/1
150 TABLET ORAL EVERY MORNING
Qty: 30 TABLET | Refills: 2 | Status: SHIPPED | OUTPATIENT
Start: 2024-12-04

## 2024-12-04 RX ORDER — FLUOXETINE 40 MG/1
40 CAPSULE ORAL DAILY
Qty: 30 CAPSULE | Refills: 2 | Status: SHIPPED | OUTPATIENT
Start: 2024-12-04

## 2024-12-04 NOTE — PROGRESS NOTES
[facility] [place] [facility]  [facility]  [address]  [facility] [address]  Dept: [phone]  Dept Fax: [phone]  Loc: [phone]    Visit Date: 12/4/2024    SUBJECTIVE DATA     CHIEF COMPLAINT:    Chief Complaint   Patient presents with    Anxiety    Depression    Follow-up       History obtained from: patient    HISTORY OF PRESENT ILLNESS:    Easton Montoya is a 64 y.o. female who presents for follow up for management of mood and anxiety.   Reports medication compliance, states \"the tremor has gotten a little better with going down on the Prozac, but it is still there and they aren't sure if it is from something else going on\"  Denies other side effects      HPI    Depression  -Patient denies feeling sad, down or depressed states \"I am just so tired.\"   -Denies anhedonia  -Reports concentration is \"poor\"  -Reports appetite is stable   -Reports occasional feelings of guilt  -Denies feeling Hopeless or helpless  -Reports Energy and motivation are poor  -Denies thoughts of harm to self or others     Sleep  -Denies trouble initiating sleep or maintaining sleep overall  -Avg hours of sleep: 10 hours average nightly broken  -Reports she typically does not feel rested in the mornings or throughout the day  -Snores: denies   -Reports she does not maintain a normal sleep routine and will nap most days     Anxiety  -Reports worrying and trouble controlling worry \"at times\" reports stressor exacerbate anxiety   -Denies feeling nervous or on edge for no apparent reason   -Reports she can be easily irritated and annoyed on occasion   -Denies feeling restless or trouble relaxing     Denies hallucinations, delusions, homicidal ideations or suicidal ideations.     Support -        States she is not attending individual therapy at this time    Reports current health issues which are causing her stress  -She has seen ID for EBV, has had an MRI and is now being

## 2024-12-04 NOTE — PATIENT INSTRUCTIONS
-Please take medications as prescribed  -Refrain from alcohol or drug use  -Seek emergency help via the emergency and/or calling 911 should symptoms become severe, worsen, or with other concerning symptoms.Go immediately to the emergency room and/or call 911 with any suicidal or homicidal ideations or if audio/visual hallucinations develop.   -Contact office with any questions or concerns.     Crisis phone numbers:  [facility] [phone].  [facility] [phone]  [facility] [phone].  [facility] [phone].  [facility] [phone].  [facility] [phone].

## 2024-12-06 ENCOUNTER — PATIENT MESSAGE (OUTPATIENT)
Dept: FAMILY MEDICINE CLINIC | Age: 64
End: 2024-12-06

## 2024-12-06 RX ORDER — LISINOPRIL AND HYDROCHLOROTHIAZIDE 10; 12.5 MG/1; MG/1
1 TABLET ORAL DAILY
Qty: 30 TABLET | Refills: 0 | Status: SHIPPED | OUTPATIENT
Start: 2024-12-06

## 2024-12-20 RX ORDER — FLUOXETINE 10 MG/1
CAPSULE ORAL
Qty: 53 CAPSULE | Refills: 0 | OUTPATIENT
Start: 2024-12-20 | End: 2025-01-18

## 2024-12-20 RX ORDER — DULOXETIN HYDROCHLORIDE 30 MG/1
CAPSULE, DELAYED RELEASE ORAL DAILY
Qty: 30 CAPSULE | Refills: 0 | OUTPATIENT
Start: 2024-12-20

## 2025-01-10 ENCOUNTER — OFFICE VISIT (OUTPATIENT)
Age: 65
End: 2025-01-10
Payer: MEDICARE

## 2025-01-10 VITALS
BODY MASS INDEX: 23.53 KG/M2 | HEIGHT: 65 IN | WEIGHT: 141.2 LBS | DIASTOLIC BLOOD PRESSURE: 58 MMHG | SYSTOLIC BLOOD PRESSURE: 111 MMHG | HEART RATE: 72 BPM

## 2025-01-10 DIAGNOSIS — R68.89 ABNORMAL ANKLE BRACHIAL INDEX (ABI): Primary | ICD-10-CM

## 2025-01-10 PROCEDURE — 1160F RVW MEDS BY RX/DR IN RCRD: CPT | Performed by: THORACIC SURGERY (CARDIOTHORACIC VASCULAR SURGERY)

## 2025-01-10 PROCEDURE — 1123F ACP DISCUSS/DSCN MKR DOCD: CPT | Performed by: THORACIC SURGERY (CARDIOTHORACIC VASCULAR SURGERY)

## 2025-01-10 PROCEDURE — 1159F MED LIST DOCD IN RCRD: CPT | Performed by: THORACIC SURGERY (CARDIOTHORACIC VASCULAR SURGERY)

## 2025-01-10 PROCEDURE — 99204 OFFICE O/P NEW MOD 45 MIN: CPT | Performed by: THORACIC SURGERY (CARDIOTHORACIC VASCULAR SURGERY)

## 2025-01-10 PROCEDURE — 3078F DIAST BP <80 MM HG: CPT | Performed by: THORACIC SURGERY (CARDIOTHORACIC VASCULAR SURGERY)

## 2025-01-10 PROCEDURE — 3074F SYST BP LT 130 MM HG: CPT | Performed by: THORACIC SURGERY (CARDIOTHORACIC VASCULAR SURGERY)

## 2025-01-10 ASSESSMENT — ENCOUNTER SYMPTOMS
RESPIRATORY NEGATIVE: 1
EYES NEGATIVE: 1
GASTROINTESTINAL NEGATIVE: 1

## 2025-01-10 NOTE — PATIENT INSTRUCTIONS
If you receive a survey asking about your care experience, please respond. Your answers will help ensure you receive high-quality care at this office. Thank you!    Your Medical Assistant today: Sarah RODAS  Thank you for coming to our office! It was a pleasure to serve you.

## 2025-01-10 NOTE — PROGRESS NOTES
01/10/25 64 kg (141 lb 3.2 oz)   11/25/24 62.7 kg (138 lb 3.2 oz)   10/24/24 63.8 kg (140 lb 9.6 oz)     BP Readings from Last 3 Encounters:   01/10/25 (!) 111/58   11/25/24 114/74   10/24/24 (!) 148/82       Physical Exam  Constitutional:       Appearance: Normal appearance.   HENT:      Head: Normocephalic and atraumatic.   Neck:      Vascular: No carotid bruit.   Cardiovascular:      Rate and Rhythm: Normal rate and regular rhythm.      Pulses: Normal pulses.      Heart sounds: Normal heart sounds.   Pulmonary:      Effort: Pulmonary effort is normal.      Breath sounds: Normal breath sounds.   Abdominal:      General: Abdomen is flat.      Palpations: Abdomen is soft.   Musculoskeletal:         General: Normal range of motion.      Cervical back: Normal range of motion and neck supple.      Right lower leg: No edema.      Left lower leg: No edema.   Lymphadenopathy:      Cervical: No cervical adenopathy.   Skin:     General: Skin is warm and dry.      Comments: Scattered varicose veins on the left anterior shin.  No color changes or ulcers bilaterally.  No edema.   Neurological:      General: No focal deficit present.      Mental Status: She is alert and oriented to person, place, and time.      Motor: No weakness.   Psychiatric:         Mood and Affect: Mood normal.         Behavior: Behavior normal.         Lab Results   Component Value Date/Time    WBC 5.4 10/22/2024 02:10 PM    RBC 3.89 10/22/2024 02:10 PM    HGB 12.8 10/22/2024 02:10 PM    HCT 39.5 10/22/2024 02:10 PM     10/22/2024 02:10 PM    MCH 32.9 10/22/2024 02:10 PM    MCHC 32.4 10/22/2024 02:10 PM    RDW 12.4 10/22/2024 02:10 PM     10/22/2024 02:10 PM    MPV 10.9 09/20/2023 12:02 PM       Lab Results   Component Value Date/Time     10/22/2024 02:10 PM    K 4.1 10/22/2024 02:10 PM     10/22/2024 02:10 PM    CO2 26 10/22/2024 02:10 PM    BUN 10 10/22/2024 02:10 PM    CREATININE 0.68 10/22/2024 02:10 PM    CALCIUM 8.8

## 2025-01-22 RX ORDER — BUPROPION HYDROCHLORIDE 100 MG/1
100 TABLET, EXTENDED RELEASE ORAL 2 TIMES DAILY
Qty: 60 TABLET | Refills: 0 | OUTPATIENT
Start: 2025-01-22

## 2025-01-26 DIAGNOSIS — E53.8 DEFICIENCY OF OTHER SPECIFIED B GROUP VITAMINS: ICD-10-CM

## 2025-01-27 ENCOUNTER — HOSPITAL ENCOUNTER (OUTPATIENT)
Dept: AUDIOLOGY | Age: 65
Discharge: HOME | End: 2025-01-27
Payer: MEDICARE

## 2025-01-27 PROCEDURE — 92540 BASIC VESTIBULAR EVALUATION: CPT | Performed by: AUDIOLOGIST

## 2025-01-27 PROCEDURE — 92567 TYMPANOMETRY: CPT | Performed by: AUDIOLOGIST

## 2025-01-27 PROCEDURE — 92537 CALORIC VSTBLR TEST W/REC: CPT | Performed by: AUDIOLOGIST

## 2025-01-27 RX ORDER — CYANOCOBALAMIN 1000 UG/ML
INJECTION, SOLUTION INTRAMUSCULAR; SUBCUTANEOUS
Qty: 3 ML | Refills: 3 | Status: SHIPPED | OUTPATIENT
Start: 2025-01-27

## 2025-01-27 NOTE — PROGRESS NOTES
ACCOUNT #: [id number]      VNG REPORT      CHIEF COMPLAINT: VNG testing per the request of JOSHUA Nova, due to the diagnosis of dizziness and vertigo. The patient has been experiencing balance issues for about 1 year. She reported increased dizziness with overall weakness, fatigue and body ache following a suspected case of Ingrid-Barr virus (EBV) in October 2024. She stated that she just could not move for about 1 month. She also loses her balance while walking and reading makes her dizzy. She was evaluated by PT 09/10/2024 and found to have impaired balance, impaired VOR, gait deviations, and decreased functional LE strength that limits her ability to walk on uneven surfaces in the community, bend to retreive items off the floor and get up from a chair. Patient completed 2 therapy sessions before discontinuing due to health issues (possible EBV and related issues).    MEDICATIONS REVIEWED: Patient has held appropriate medications for VNG testing.    OTOSCOPY: clear canal with normal appearing tympanic membrane- bilaterally.    TYMPANOMETRY: normal middle ear compliance, middle ear pressure and middle ear volume in each ear indicating normal middle ear function, bilaterally.    VNG RESULTS:    GAZE: WNL  SMOOTH PURSUIT: ABNORMAL- saccadic smooth pursuit. Did not improve with repetition.  RANDOM SACCADE: WNL  OPTOKINETIC: WNL  SPONTANEOUS NYSTAGMUS:  NONE   SAMEERA-HALLPIKE: WNL  POSITIONAL: WNL  CALORIC TESTING: WNL  ANDRZEJ' SOT: WNL    IMPRESSIONS    VNG results showed an abnormal oculomotor finding which would suggest a possible central etiology. Abnormal fixation suppression was noted in one caloric testing condition. Abnormal fixation, in one condition only, is not considered a significant finding. There were no significant peripheral vestibular findings.      RECOMMENDATIONS:  1- Follow up with referring provider regarding these results and any further testing or treatment recommendations.  2- Complete

## 2025-01-29 ENCOUNTER — TELEMEDICINE (OUTPATIENT)
Dept: PSYCHIATRY | Age: 65
End: 2025-01-29
Payer: MEDICARE

## 2025-01-29 DIAGNOSIS — F34.1 PERSISTENT DEPRESSIVE DISORDER: Primary | ICD-10-CM

## 2025-01-29 DIAGNOSIS — G47.00 INSOMNIA, UNSPECIFIED TYPE: ICD-10-CM

## 2025-01-29 DIAGNOSIS — F41.1 GENERALIZED ANXIETY DISORDER: ICD-10-CM

## 2025-01-29 DIAGNOSIS — F43.0 ACUTE REACTION TO STRESS: ICD-10-CM

## 2025-01-29 PROCEDURE — 1159F MED LIST DOCD IN RCRD: CPT

## 2025-01-29 PROCEDURE — 99214 OFFICE O/P EST MOD 30 MIN: CPT

## 2025-01-29 PROCEDURE — 1123F ACP DISCUSS/DSCN MKR DOCD: CPT

## 2025-01-29 PROCEDURE — 1160F RVW MEDS BY RX/DR IN RCRD: CPT

## 2025-01-29 RX ORDER — FLUOXETINE 40 MG/1
40 CAPSULE ORAL DAILY
Qty: 30 CAPSULE | Refills: 2 | Status: SHIPPED | OUTPATIENT
Start: 2025-01-29

## 2025-01-29 RX ORDER — BUPROPION HYDROCHLORIDE 150 MG/1
150 TABLET ORAL EVERY MORNING
Qty: 30 TABLET | Refills: 2 | Status: SHIPPED | OUTPATIENT
Start: 2025-01-29

## 2025-01-29 RX ORDER — TRAZODONE HYDROCHLORIDE 50 MG/1
TABLET, FILM COATED ORAL
Qty: 30 TABLET | Refills: 0 | Status: SHIPPED | OUTPATIENT
Start: 2025-01-29

## 2025-01-29 NOTE — PATIENT INSTRUCTIONS
-Please take medications as prescribed  -Refrain from alcohol or drug use  -Seek emergency help via the emergency and/or calling 911 should symptoms become severe, worsen, or with other concerning symptoms.Go immediately to the emergency room and/or call 911 with any suicidal or homicidal ideations or if audio/visual hallucinations develop.   -Contact office with any questions or concerns.     Crisis phone numbers -831 Crisis Line  [facility] [phone].  [facility] [phone]  [facility] [phone].  [facility] [phone].  [facility] [phone].  [facility] [phone].

## 2025-02-04 ENCOUNTER — PATIENT MESSAGE (OUTPATIENT)
Dept: FAMILY MEDICINE CLINIC | Age: 65
End: 2025-02-04

## 2025-02-04 NOTE — TELEPHONE ENCOUNTER
I just reviewed the VNG, your MRI of brain is reassuring but I would keep your neurology appointment and bring those results with you.     When do you see them?

## 2025-02-10 ENCOUNTER — SPECIALTY PHARMACY (OUTPATIENT)
Dept: PHARMACY | Facility: CLINIC | Age: 65
End: 2025-02-10

## 2025-02-24 RX ORDER — TRAZODONE HYDROCHLORIDE 50 MG/1
TABLET ORAL
Qty: 90 TABLET | Refills: 1 | OUTPATIENT
Start: 2025-02-24

## 2025-03-04 ENCOUNTER — HOSPITAL ENCOUNTER (OUTPATIENT)
Age: 65
Discharge: HOME | End: 2025-03-06
Payer: MEDICARE

## 2025-03-04 DIAGNOSIS — R42 DIZZINESS: ICD-10-CM

## 2025-03-04 DIAGNOSIS — R94.31 ABNORMAL EKG: ICD-10-CM

## 2025-03-04 DIAGNOSIS — R42 LIGHTHEADED: ICD-10-CM

## 2025-03-04 DIAGNOSIS — R42 VERTIGO: ICD-10-CM

## 2025-03-04 DIAGNOSIS — I10 HYPERTENSION, UNSPECIFIED TYPE: ICD-10-CM

## 2025-03-04 PROCEDURE — 93306 TTE W/DOPPLER COMPLETE: CPT

## 2025-03-05 ENCOUNTER — TELEPHONE (OUTPATIENT)
Dept: FAMILY MEDICINE CLINIC | Age: 65
End: 2025-03-05

## 2025-03-05 LAB
ECHO AO ASC DIAM: 2.7 CM
ECHO AV CUSP MM: 1.8 CM
ECHO AV PEAK GRADIENT: 6 MMHG
ECHO AV PEAK VELOCITY: 1.2 M/S
ECHO AV VELOCITY RATIO: 0.92
ECHO LA AREA 2C: 10.3 CM2
ECHO LA AREA 4C: 9.4 CM2
ECHO LA DIAMETER: 3.6 CM
ECHO LA MAJOR AXIS: 3.7 CM
ECHO LA MINOR AXIS: 4 CM
ECHO LA VOL BP: 21 ML (ref 22–52)
ECHO LA VOL MOD A2C: 21 ML (ref 22–52)
ECHO LA VOL MOD A4C: 19 ML (ref 22–52)
ECHO LV E' LATERAL VELOCITY: 6.3 CM/S
ECHO LV E' SEPTAL VELOCITY: 6.3 CM/S
ECHO LV EF PHYSICIAN: 60 %
ECHO LV FRACTIONAL SHORTENING: 30 % (ref 28–44)
ECHO LV INTERNAL DIMENSION DIASTOLIC: 4 CM (ref 3.9–5.3)
ECHO LV INTERNAL DIMENSION SYSTOLIC: 2.8 CM
ECHO LV ISOVOLUMETRIC RELAXATION TIME (IVRT): 74 MS
ECHO LV IVSD: 0.8 CM (ref 0.6–0.9)
ECHO LV MASS 2D: 101.4 G (ref 67–162)
ECHO LV POSTERIOR WALL DIASTOLIC: 0.9 CM (ref 0.6–0.9)
ECHO LV RELATIVE WALL THICKNESS RATIO: 0.45
ECHO LVOT PEAK GRADIENT: 5 MMHG
ECHO LVOT PEAK VELOCITY: 1.1 M/S
ECHO MV A VELOCITY: 0.73 M/S
ECHO MV E DECELERATION TIME (DT): 283 MS
ECHO MV E VELOCITY: 0.51 M/S
ECHO MV E/A RATIO: 0.7
ECHO MV E/E' LATERAL: 8.1
ECHO MV E/E' RATIO (AVERAGED): 8.1
ECHO MV E/E' SEPTAL: 8.1
ECHO MV REGURGITANT PEAK GRADIENT: 121 MMHG
ECHO MV REGURGITANT PEAK VELOCITY: 5.5 M/S
ECHO PULMONARY ARTERY END DIASTOLIC PRESSURE: 2 MMHG
ECHO PV MAX VELOCITY: 0.7 M/S
ECHO PV PEAK GRADIENT: 2 MMHG
ECHO PV REGURGITANT MAX VELOCITY: 0.6 M/S
ECHO RV INTERNAL DIMENSION: 2.8 CM
ECHO RV TAPSE: 1.9 CM (ref 1.7–?)
ECHO TV E WAVE: 0.5 M/S
ECHO TV REGURGITANT MAX VELOCITY: 2.47 M/S
ECHO TV REGURGITANT PEAK GRADIENT: 24 MMHG

## 2025-03-05 NOTE — TELEPHONE ENCOUNTER
Left message on answering machine. Requested pt to call back at [phone], at their earliest convenience.

## 2025-03-05 NOTE — TELEPHONE ENCOUNTER
----- Message from LASHANDA Nova CNP sent at 3/5/2025  3:09 PM EST -----  Echo looks ok, Ejection fraction is normal, she does have some leaky valves, which are mild. We can discuss at next appt.

## 2025-03-06 ENCOUNTER — TELEMEDICINE (OUTPATIENT)
Dept: PSYCHIATRY | Age: 65
End: 2025-03-06
Payer: MEDICARE

## 2025-03-06 DIAGNOSIS — F34.1 PERSISTENT DEPRESSIVE DISORDER: Primary | ICD-10-CM

## 2025-03-06 DIAGNOSIS — G47.00 INSOMNIA, UNSPECIFIED TYPE: ICD-10-CM

## 2025-03-06 DIAGNOSIS — F41.1 GENERALIZED ANXIETY DISORDER: ICD-10-CM

## 2025-03-06 DIAGNOSIS — F43.0 ACUTE REACTION TO STRESS: ICD-10-CM

## 2025-03-06 PROCEDURE — 1159F MED LIST DOCD IN RCRD: CPT

## 2025-03-06 PROCEDURE — 1160F RVW MEDS BY RX/DR IN RCRD: CPT

## 2025-03-06 PROCEDURE — 99214 OFFICE O/P EST MOD 30 MIN: CPT

## 2025-03-06 PROCEDURE — 1123F ACP DISCUSS/DSCN MKR DOCD: CPT

## 2025-03-06 ASSESSMENT — PATIENT HEALTH QUESTIONNAIRE - PHQ9
SUM OF ALL RESPONSES TO PHQ QUESTIONS 1-9: 6
2. FEELING DOWN, DEPRESSED OR HOPELESS: SEVERAL DAYS
SUM OF ALL RESPONSES TO PHQ QUESTIONS 1-9: 6
3. TROUBLE FALLING OR STAYING ASLEEP: MORE THAN HALF THE DAYS
6. FEELING BAD ABOUT YOURSELF - OR THAT YOU ARE A FAILURE OR HAVE LET YOURSELF OR YOUR FAMILY DOWN: NOT AT ALL
10. IF YOU CHECKED OFF ANY PROBLEMS, HOW DIFFICULT HAVE THESE PROBLEMS MADE IT FOR YOU TO DO YOUR WORK, TAKE CARE OF THINGS AT HOME, OR GET ALONG WITH OTHER PEOPLE: SOMEWHAT DIFFICULT
4. FEELING TIRED OR HAVING LITTLE ENERGY: MORE THAN HALF THE DAYS
5. POOR APPETITE OR OVEREATING: NOT AT ALL
3. TROUBLE FALLING OR STAYING ASLEEP: MORE THAN HALF THE DAYS
1. LITTLE INTEREST OR PLEASURE IN DOING THINGS: SEVERAL DAYS
5. POOR APPETITE OR OVEREATING: NOT AT ALL
8. MOVING OR SPEAKING SO SLOWLY THAT OTHER PEOPLE COULD HAVE NOTICED. OR THE OPPOSITE - BEING SO FIDGETY OR RESTLESS THAT YOU HAVE BEEN MOVING AROUND A LOT MORE THAN USUAL: NOT AT ALL
SUM OF ALL RESPONSES TO PHQ QUESTIONS 1-9: 6
9. THOUGHTS THAT YOU WOULD BE BETTER OFF DEAD, OR OF HURTING YOURSELF: NOT AT ALL
6. FEELING BAD ABOUT YOURSELF - OR THAT YOU ARE A FAILURE OR HAVE LET YOURSELF OR YOUR FAMILY DOWN: NOT AT ALL
9. THOUGHTS THAT YOU WOULD BE BETTER OFF DEAD, OR OF HURTING YOURSELF: NOT AT ALL
10. IF YOU CHECKED OFF ANY PROBLEMS, HOW DIFFICULT HAVE THESE PROBLEMS MADE IT FOR YOU TO DO YOUR WORK, TAKE CARE OF THINGS AT HOME, OR GET ALONG WITH OTHER PEOPLE: SOMEWHAT DIFFICULT
SUM OF ALL RESPONSES TO PHQ QUESTIONS 1-9: 6
8. MOVING OR SPEAKING SO SLOWLY THAT OTHER PEOPLE COULD HAVE NOTICED. OR THE OPPOSITE, BEING SO FIGETY OR RESTLESS THAT YOU HAVE BEEN MOVING AROUND A LOT MORE THAN USUAL: NOT AT ALL
2. FEELING DOWN, DEPRESSED OR HOPELESS: SEVERAL DAYS
7. TROUBLE CONCENTRATING ON THINGS, SUCH AS READING THE NEWSPAPER OR WATCHING TELEVISION: NOT AT ALL
4. FEELING TIRED OR HAVING LITTLE ENERGY: MORE THAN HALF THE DAYS
SUM OF ALL RESPONSES TO PHQ QUESTIONS 1-9: 6
7. TROUBLE CONCENTRATING ON THINGS, SUCH AS READING THE NEWSPAPER OR WATCHING TELEVISION: NOT AT ALL
1. LITTLE INTEREST OR PLEASURE IN DOING THINGS: SEVERAL DAYS

## 2025-03-06 ASSESSMENT — ANXIETY QUESTIONNAIRES
IF YOU CHECKED OFF ANY PROBLEMS ON THIS QUESTIONNAIRE, HOW DIFFICULT HAVE THESE PROBLEMS MADE IT FOR YOU TO DO YOUR WORK, TAKE CARE OF THINGS AT HOME, OR GET ALONG WITH OTHER PEOPLE: SOMEWHAT DIFFICULT
7. FEELING AFRAID AS IF SOMETHING AWFUL MIGHT HAPPEN: SEVERAL DAYS
3. WORRYING TOO MUCH ABOUT DIFFERENT THINGS: SEVERAL DAYS
IF YOU CHECKED OFF ANY PROBLEMS ON THIS QUESTIONNAIRE, HOW DIFFICULT HAVE THESE PROBLEMS MADE IT FOR YOU TO DO YOUR WORK, TAKE CARE OF THINGS AT HOME, OR GET ALONG WITH OTHER PEOPLE: SOMEWHAT DIFFICULT
6. BECOMING EASILY ANNOYED OR IRRITABLE: SEVERAL DAYS
2. NOT BEING ABLE TO STOP OR CONTROL WORRYING: SEVERAL DAYS
5. BEING SO RESTLESS THAT IT IS HARD TO SIT STILL: NOT AT ALL
6. BECOMING EASILY ANNOYED OR IRRITABLE: SEVERAL DAYS
7. FEELING AFRAID AS IF SOMETHING AWFUL MIGHT HAPPEN: SEVERAL DAYS
4. TROUBLE RELAXING: SEVERAL DAYS
GAD7 TOTAL SCORE: 6
1. FEELING NERVOUS, ANXIOUS, OR ON EDGE: SEVERAL DAYS
5. BEING SO RESTLESS THAT IT IS HARD TO SIT STILL: NOT AT ALL
4. TROUBLE RELAXING: SEVERAL DAYS
3. WORRYING TOO MUCH ABOUT DIFFERENT THINGS: SEVERAL DAYS
1. FEELING NERVOUS, ANXIOUS, OR ON EDGE: SEVERAL DAYS
2. NOT BEING ABLE TO STOP OR CONTROL WORRYING: SEVERAL DAYS

## 2025-03-06 NOTE — PATIENT INSTRUCTIONS
-Please take medications as prescribed  -Refrain from alcohol or drug use  -Seek emergency help via the emergency and/or calling 911 should symptoms become severe, worsen, or with other concerning symptoms.Go immediately to the emergency room and/or call 911 with any suicidal or homicidal ideations or if audio/visual hallucinations develop.   -Contact office with any questions or concerns.     Crisis phone numbers -287 Crisis Line  [facility] [phone].  [facility] [phone]  [facility] [phone].  [facility] [phone].  [facility] [phone].  [facility] [phone].

## 2025-03-06 NOTE — PROGRESS NOTES
[facility] [place] [facility]  [facility]  [address]  [facility] [address]  Dept: [phone]  Dept Fax: [phone]  Loc: [phone]    Visit Date: 3/6/2025    SUBJECTIVE DATA     CHIEF COMPLAINT:    Chief Complaint   Patient presents with    Anxiety    Follow-up    Depression       History obtained from: patient    HISTORY OF PRESENT ILLNESS:    Easton Montoya is a 65 y.o. female who presents for follow up for management of mood and anxiety.   Reports medication compliance, denies side effects. States she is doing \"ok.\"         Depression  -Patient endorses feeling sad, down and depressed \"some days\"   -Denies anhedonia, reports a decrease in interest   -Reports concentration is \"ok\"  -Reports appetite is stable   -Reports occasional feelings of guilt  -Denies feeling Hopeless or helpless  -Reports Energy and motivation are poor   -Denies thoughts of harm to self or others     Sleep  -Denies difficulty initiating/managing sleep with use of Trazodone   -States she has been sleeping average of 8 hours a night and not feeling rested   -Snores: denies   -Reports she does not maintain a normal sleep routine and will nap most days     Anxiety  -Reports worrying and trouble controlling worry \"at times\" reports stressor exacerbate anxiety   -Denies feeling nervous or on edge for no apparent reason   -Reports she can be easily irritated and annoyed on occasion   -Denies feeling restless or trouble relaxing     Denies hallucinations, delusions, homicidal ideations or suicidal ideations.     Support -      States she is not attending individual therapy at this time    Reports current health issues which are causing her stress  -She is following with neurology due to abnormal brain MRI    States since retiring she has been struggling with a send of \"purpose\"  -Discuss volunteering or possible part time employment (as was previously recommended) as this would be

## 2025-03-11 RX ORDER — TRAZODONE HYDROCHLORIDE 50 MG/1
TABLET ORAL
Qty: 30 TABLET | Refills: 0 | Status: SHIPPED | OUTPATIENT
Start: 2025-03-11

## 2025-03-11 RX ORDER — FLUOXETINE HYDROCHLORIDE 40 MG/1
40 CAPSULE ORAL DAILY
Qty: 30 CAPSULE | Refills: 2 | Status: SHIPPED | OUTPATIENT
Start: 2025-03-11

## 2025-03-11 RX ORDER — BUPROPION HYDROCHLORIDE 150 MG/1
150 TABLET ORAL EVERY MORNING
Qty: 30 TABLET | Refills: 2 | Status: SHIPPED | OUTPATIENT
Start: 2025-03-11

## 2025-03-11 RX ORDER — FLUOXETINE 10 MG/1
10 CAPSULE ORAL DAILY
Qty: 30 CAPSULE | Refills: 0 | Status: SHIPPED | OUTPATIENT
Start: 2025-03-11

## 2025-03-17 SDOH — ECONOMIC STABILITY: FOOD INSECURITY: WITHIN THE PAST 12 MONTHS, THE FOOD YOU BOUGHT JUST DIDN'T LAST AND YOU DIDN'T HAVE MONEY TO GET MORE.: NEVER TRUE

## 2025-03-17 SDOH — ECONOMIC STABILITY: FOOD INSECURITY: WITHIN THE PAST 12 MONTHS, YOU WORRIED THAT YOUR FOOD WOULD RUN OUT BEFORE YOU GOT MONEY TO BUY MORE.: NEVER TRUE

## 2025-03-17 SDOH — ECONOMIC STABILITY: INCOME INSECURITY: IN THE LAST 12 MONTHS, WAS THERE A TIME WHEN YOU WERE NOT ABLE TO PAY THE MORTGAGE OR RENT ON TIME?: NO

## 2025-03-17 SDOH — ECONOMIC STABILITY: TRANSPORTATION INSECURITY
IN THE PAST 12 MONTHS, HAS THE LACK OF TRANSPORTATION KEPT YOU FROM MEDICAL APPOINTMENTS OR FROM GETTING MEDICATIONS?: NO

## 2025-03-17 SDOH — ECONOMIC STABILITY: TRANSPORTATION INSECURITY
IN THE PAST 12 MONTHS, HAS LACK OF TRANSPORTATION KEPT YOU FROM MEETINGS, WORK, OR FROM GETTING THINGS NEEDED FOR DAILY LIVING?: NO

## 2025-03-20 ENCOUNTER — OFFICE VISIT (OUTPATIENT)
Dept: FAMILY MEDICINE CLINIC | Age: 65
End: 2025-03-20
Payer: MEDICARE

## 2025-03-20 VITALS
DIASTOLIC BLOOD PRESSURE: 74 MMHG | SYSTOLIC BLOOD PRESSURE: 112 MMHG | WEIGHT: 143.6 LBS | OXYGEN SATURATION: 98 % | HEART RATE: 68 BPM | RESPIRATION RATE: 14 BRPM | TEMPERATURE: 97.4 F | BODY MASS INDEX: 23.93 KG/M2 | HEIGHT: 65 IN

## 2025-03-20 DIAGNOSIS — I10 HYPERTENSION, UNSPECIFIED TYPE: Primary | ICD-10-CM

## 2025-03-20 DIAGNOSIS — J06.9 UPPER RESPIRATORY TRACT INFECTION, UNSPECIFIED TYPE: ICD-10-CM

## 2025-03-20 DIAGNOSIS — R42 DIZZINESS: ICD-10-CM

## 2025-03-20 DIAGNOSIS — Z86.19 HISTORY OF EPSTEIN-BARR VIRUS INFECTION: ICD-10-CM

## 2025-03-20 DIAGNOSIS — R68.89 ABNORMAL ANKLE BRACHIAL INDEX (ABI): ICD-10-CM

## 2025-03-20 DIAGNOSIS — F41.1 GAD (GENERALIZED ANXIETY DISORDER): ICD-10-CM

## 2025-03-20 PROCEDURE — 99214 OFFICE O/P EST MOD 30 MIN: CPT | Performed by: NURSE PRACTITIONER

## 2025-03-20 PROCEDURE — 3074F SYST BP LT 130 MM HG: CPT | Performed by: NURSE PRACTITIONER

## 2025-03-20 PROCEDURE — 1159F MED LIST DOCD IN RCRD: CPT | Performed by: NURSE PRACTITIONER

## 2025-03-20 PROCEDURE — 3078F DIAST BP <80 MM HG: CPT | Performed by: NURSE PRACTITIONER

## 2025-03-20 PROCEDURE — 1123F ACP DISCUSS/DSCN MKR DOCD: CPT | Performed by: NURSE PRACTITIONER

## 2025-03-20 RX ORDER — BENZONATATE 200 MG/1
200 CAPSULE ORAL 3 TIMES DAILY PRN
Qty: 30 CAPSULE | Refills: 0 | Status: SHIPPED | OUTPATIENT
Start: 2025-03-20 | End: 2025-03-30

## 2025-03-20 RX ORDER — AZITHROMYCIN 250 MG/1
TABLET, FILM COATED ORAL
Qty: 1 PACKET | Refills: 0 | Status: SHIPPED | OUTPATIENT
Start: 2025-03-20

## 2025-03-20 NOTE — PROGRESS NOTES
HTN?  Improving but still having some episodes of dizziness  Fu with [facility] neurologist  Discussed CTA and MRI for further evaluation will hold off since symptoms seem to be improving but if she would note any worsening of symptoms at all, will order  ECHO was done  Recommend starting baby asa, and pt would like to hold off on Statin at this time  - Comprehensive Metabolic Panel; Future  - Lipid, Fasting; Future    4. Abnormal ankle brachial index (NATALIYA)  Seen by Vascular, no further fu needed    5. History of Ingrid-Barr virus infection  Seen by ID, no further fu needed    6. AVTAR (generalized anxiety disorder)  Continue fu with psych and continue prozac and wellbutrin  Trazodone for sleep      Return in about 6 months (around 9/20/2025) for chronic conditions.        All copied or forwarded information in the progress note was verified by me to be accurate at the time of visit  Patient's past medical, surgical, social and family history were reviewed and updated     All patient questions answered.  Patient voiced understanding.

## 2025-04-01 LAB
ALBUMIN: 4.6 G/DL
ALP BLD-CCNC: 57 U/L
ALT SERPL-CCNC: 16 U/L
ANION GAP SERPL CALCULATED.3IONS-SCNC: NORMAL MMOL/L
AST SERPL-CCNC: 18 U/L
BILIRUB SERPL-MCNC: 0.6 MG/DL (ref 0.1–1.4)
BUN BLDV-MCNC: 14 MG/DL
CALCIUM SERPL-MCNC: 9.2 MG/DL
CHLORIDE BLD-SCNC: 102 MMOL/L
CHOLESTEROL, TOTAL: 207 MG/DL
CHOLESTEROL/HDL RATIO: NORMAL
CO2: 25 MMOL/L
CREAT SERPL-MCNC: 0.75 MG/DL
GFR, ESTIMATED: 87
GLUCOSE BLD-MCNC: 87 MG/DL
HDLC SERPL-MCNC: 57 MG/DL (ref 35–70)
LDL CHOLESTEROL: 114
NONHDLC SERPL-MCNC: NORMAL MG/DL
POTASSIUM SERPL-SCNC: 4.5 MMOL/L
SODIUM BLD-SCNC: 139 MMOL/L
TOTAL PROTEIN: 6.4 G/DL (ref 6.4–8.2)
TRIGL SERPL-MCNC: 209 MG/DL
VLDLC SERPL CALC-MCNC: 36 MG/DL

## 2025-04-09 RX ORDER — TRAZODONE HYDROCHLORIDE 50 MG/1
TABLET ORAL
Qty: 30 TABLET | Refills: 0 | OUTPATIENT
Start: 2025-04-09

## 2025-04-10 ENCOUNTER — HOSPITAL ENCOUNTER (OUTPATIENT)
Dept: WOMENS IMAGING | Age: 65
Discharge: HOME | End: 2025-04-10
Payer: MEDICARE

## 2025-04-10 VITALS — HEIGHT: 65 IN | WEIGHT: 140 LBS | BODY MASS INDEX: 23.32 KG/M2

## 2025-04-10 DIAGNOSIS — Z12.31 VISIT FOR SCREENING MAMMOGRAM: ICD-10-CM

## 2025-04-10 PROCEDURE — 77067 SCR MAMMO BI INCL CAD: CPT

## 2025-04-11 ENCOUNTER — RESULTS FOLLOW-UP (OUTPATIENT)
Dept: FAMILY MEDICINE CLINIC | Age: 65
End: 2025-04-11

## 2025-04-11 DIAGNOSIS — R92.30 INCONCLUSIVE MAMMOGRAPHY DUE TO DENSE BREASTS: Primary | ICD-10-CM

## 2025-04-11 DIAGNOSIS — R92.2 INCONCLUSIVE MAMMOGRAPHY DUE TO DENSE BREASTS: Primary | ICD-10-CM

## 2025-04-15 ENCOUNTER — RESULTS FOLLOW-UP (OUTPATIENT)
Dept: FAMILY MEDICINE CLINIC | Age: 65
End: 2025-04-15

## 2025-04-16 NOTE — TELEPHONE ENCOUNTER
Left detailed message regarding lab results. Okay per HIPAA. Requested call back at [phone]  if they have any further questions.

## 2025-05-06 ENCOUNTER — TELEMEDICINE (OUTPATIENT)
Dept: PSYCHIATRY | Age: 65
End: 2025-05-06
Payer: MEDICARE

## 2025-05-06 DIAGNOSIS — F34.1 PERSISTENT DEPRESSIVE DISORDER: Primary | ICD-10-CM

## 2025-05-06 DIAGNOSIS — G47.00 INSOMNIA, UNSPECIFIED TYPE: ICD-10-CM

## 2025-05-06 DIAGNOSIS — F41.1 GENERALIZED ANXIETY DISORDER: ICD-10-CM

## 2025-05-06 PROCEDURE — 1160F RVW MEDS BY RX/DR IN RCRD: CPT

## 2025-05-06 PROCEDURE — 99214 OFFICE O/P EST MOD 30 MIN: CPT

## 2025-05-06 PROCEDURE — 1159F MED LIST DOCD IN RCRD: CPT

## 2025-05-06 PROCEDURE — 1123F ACP DISCUSS/DSCN MKR DOCD: CPT

## 2025-05-06 RX ORDER — FLUOXETINE HYDROCHLORIDE 40 MG/1
40 CAPSULE ORAL DAILY
Qty: 30 CAPSULE | Refills: 2 | Status: SHIPPED | OUTPATIENT
Start: 2025-05-06

## 2025-05-06 RX ORDER — FLUOXETINE 10 MG/1
10 CAPSULE ORAL DAILY
Qty: 30 CAPSULE | Refills: 2 | Status: SHIPPED | OUTPATIENT
Start: 2025-05-06

## 2025-05-06 RX ORDER — BUPROPION HYDROCHLORIDE 150 MG/1
150 TABLET ORAL EVERY MORNING
Qty: 30 TABLET | Refills: 2 | Status: SHIPPED | OUTPATIENT
Start: 2025-05-06

## 2025-05-06 RX ORDER — TRAZODONE HYDROCHLORIDE 50 MG/1
TABLET ORAL
Qty: 30 TABLET | Refills: 2 | Status: SHIPPED | OUTPATIENT
Start: 2025-05-06

## 2025-05-06 ASSESSMENT — PATIENT HEALTH QUESTIONNAIRE - PHQ9
SUM OF ALL RESPONSES TO PHQ QUESTIONS 1-9: 5
5. POOR APPETITE OR OVEREATING: NOT AT ALL
2. FEELING DOWN, DEPRESSED OR HOPELESS: SEVERAL DAYS
8. MOVING OR SPEAKING SO SLOWLY THAT OTHER PEOPLE COULD HAVE NOTICED. OR THE OPPOSITE - BEING SO FIDGETY OR RESTLESS THAT YOU HAVE BEEN MOVING AROUND A LOT MORE THAN USUAL: NOT AT ALL
9. THOUGHTS THAT YOU WOULD BE BETTER OFF DEAD, OR OF HURTING YOURSELF: NOT AT ALL
10. IF YOU CHECKED OFF ANY PROBLEMS, HOW DIFFICULT HAVE THESE PROBLEMS MADE IT FOR YOU TO DO YOUR WORK, TAKE CARE OF THINGS AT HOME, OR GET ALONG WITH OTHER PEOPLE: NOT DIFFICULT AT ALL
1. LITTLE INTEREST OR PLEASURE IN DOING THINGS: SEVERAL DAYS
SUM OF ALL RESPONSES TO PHQ QUESTIONS 1-9: 5
3. TROUBLE FALLING OR STAYING ASLEEP: MORE THAN HALF THE DAYS
9. THOUGHTS THAT YOU WOULD BE BETTER OFF DEAD, OR OF HURTING YOURSELF: NOT AT ALL
7. TROUBLE CONCENTRATING ON THINGS, SUCH AS READING THE NEWSPAPER OR WATCHING TELEVISION: NOT AT ALL
4. FEELING TIRED OR HAVING LITTLE ENERGY: SEVERAL DAYS
2. FEELING DOWN, DEPRESSED OR HOPELESS: SEVERAL DAYS
10. IF YOU CHECKED OFF ANY PROBLEMS, HOW DIFFICULT HAVE THESE PROBLEMS MADE IT FOR YOU TO DO YOUR WORK, TAKE CARE OF THINGS AT HOME, OR GET ALONG WITH OTHER PEOPLE: NOT DIFFICULT AT ALL
3. TROUBLE FALLING OR STAYING ASLEEP: MORE THAN HALF THE DAYS
6. FEELING BAD ABOUT YOURSELF - OR THAT YOU ARE A FAILURE OR HAVE LET YOURSELF OR YOUR FAMILY DOWN: NOT AT ALL
SUM OF ALL RESPONSES TO PHQ QUESTIONS 1-9: 5
1. LITTLE INTEREST OR PLEASURE IN DOING THINGS: SEVERAL DAYS
6. FEELING BAD ABOUT YOURSELF - OR THAT YOU ARE A FAILURE OR HAVE LET YOURSELF OR YOUR FAMILY DOWN: NOT AT ALL
SUM OF ALL RESPONSES TO PHQ QUESTIONS 1-9: 5
8. MOVING OR SPEAKING SO SLOWLY THAT OTHER PEOPLE COULD HAVE NOTICED. OR THE OPPOSITE, BEING SO FIGETY OR RESTLESS THAT YOU HAVE BEEN MOVING AROUND A LOT MORE THAN USUAL: NOT AT ALL
4. FEELING TIRED OR HAVING LITTLE ENERGY: SEVERAL DAYS
7. TROUBLE CONCENTRATING ON THINGS, SUCH AS READING THE NEWSPAPER OR WATCHING TELEVISION: NOT AT ALL
SUM OF ALL RESPONSES TO PHQ QUESTIONS 1-9: 5
5. POOR APPETITE OR OVEREATING: NOT AT ALL

## 2025-05-06 ASSESSMENT — ANXIETY QUESTIONNAIRES
2. NOT BEING ABLE TO STOP OR CONTROL WORRYING: SEVERAL DAYS
1. FEELING NERVOUS, ANXIOUS, OR ON EDGE: NOT AT ALL
5. BEING SO RESTLESS THAT IT IS HARD TO SIT STILL: NOT AT ALL
3. WORRYING TOO MUCH ABOUT DIFFERENT THINGS: SEVERAL DAYS
6. BECOMING EASILY ANNOYED OR IRRITABLE: NOT AT ALL
4. TROUBLE RELAXING: NOT AT ALL
GAD7 TOTAL SCORE: 3
1. FEELING NERVOUS, ANXIOUS, OR ON EDGE: NOT AT ALL
4. TROUBLE RELAXING: NOT AT ALL
6. BECOMING EASILY ANNOYED OR IRRITABLE: NOT AT ALL
IF YOU CHECKED OFF ANY PROBLEMS ON THIS QUESTIONNAIRE, HOW DIFFICULT HAVE THESE PROBLEMS MADE IT FOR YOU TO DO YOUR WORK, TAKE CARE OF THINGS AT HOME, OR GET ALONG WITH OTHER PEOPLE: NOT DIFFICULT AT ALL
IF YOU CHECKED OFF ANY PROBLEMS ON THIS QUESTIONNAIRE, HOW DIFFICULT HAVE THESE PROBLEMS MADE IT FOR YOU TO DO YOUR WORK, TAKE CARE OF THINGS AT HOME, OR GET ALONG WITH OTHER PEOPLE: NOT DIFFICULT AT ALL
7. FEELING AFRAID AS IF SOMETHING AWFUL MIGHT HAPPEN: SEVERAL DAYS
7. FEELING AFRAID AS IF SOMETHING AWFUL MIGHT HAPPEN: SEVERAL DAYS
5. BEING SO RESTLESS THAT IT IS HARD TO SIT STILL: NOT AT ALL
3. WORRYING TOO MUCH ABOUT DIFFERENT THINGS: SEVERAL DAYS
2. NOT BEING ABLE TO STOP OR CONTROL WORRYING: SEVERAL DAYS

## 2025-05-06 NOTE — PROGRESS NOTES
call 911 with any suicidal or homicidal ideations or if audio/visual hallucinations develop.  Patient given crisis center information. Patient stated understanding and agrees.       Easton Montoya, was evaluated through a synchronous (real-time) audio-video encounter. The patient (or guardian if applicable) is aware that this is a billable service, which includes applicable co-pays. This Virtual Visit was conducted with patient's (and/or legal guardian's) consent. Patient identification was verified, and a caregiver was present when appropriate.   The patient was located at Home: [address] [place] [address] [place] [address]  Provider was located at Home (Appt Dept State): [place]  Confirm you are appropriately licensed, registered, or certified to deliver care in the state where the patient is located as indicated above. If you are not or unsure, please re-schedule the visit: Yes, I confirm.                  Time spent with patient: not billed by time     Provider Signature:  Electronically signed by LASHANDA Patel CNP on 5/6/2025 at 3:59 PM    **This report has been created using voice recognition software. It may contain minor errors which are inherent in voice recognition technology.**

## 2025-05-06 NOTE — PATIENT INSTRUCTIONS
-Please take medications as prescribed  -Refrain from alcohol or drug use  -Seek emergency help via the emergency and/or calling 911 should symptoms become severe, worsen, or with other concerning symptoms.Go immediately to the emergency room and/or call 911 with any suicidal or homicidal ideations or if audio/visual hallucinations develop.   -Contact office with any questions or concerns.     Crisis phone numbers -291 Crisis Line  [facility] [phone].  [facility] [phone]  [facility] [phone].  [facility] [phone].  [facility] [phone].  [facility] [phone].

## 2025-05-27 ENCOUNTER — HOSPITAL ENCOUNTER (OUTPATIENT)
Dept: WOMENS IMAGING | Age: 65
Discharge: HOME | End: 2025-05-27
Payer: MEDICARE

## 2025-05-27 DIAGNOSIS — M81.0 AGE-RELATED OSTEOPOROSIS WITHOUT CURRENT PATHOLOGICAL FRACTURE: ICD-10-CM

## 2025-05-27 PROCEDURE — 77080 DXA BONE DENSITY AXIAL: CPT

## 2025-05-29 ENCOUNTER — TELEPHONE (OUTPATIENT)
Dept: FAMILY MEDICINE CLINIC | Age: 65
End: 2025-05-29

## 2025-05-29 ENCOUNTER — RESULTS FOLLOW-UP (OUTPATIENT)
Dept: FAMILY MEDICINE CLINIC | Age: 65
End: 2025-05-29

## 2025-05-29 DIAGNOSIS — M81.0 AGE-RELATED OSTEOPOROSIS WITHOUT CURRENT PATHOLOGICAL FRACTURE: Primary | ICD-10-CM

## 2025-05-29 NOTE — TELEPHONE ENCOUNTER
I called and spoke to Easton and she verbalized understanding and had no questions at this time.     Pt states that she has not had her Prolia shot in 1 year and pt states that it cost her over $1000 and would like switched to reclast as she said was previously discussed.

## 2025-05-29 NOTE — TELEPHONE ENCOUNTER
----- Message from LASHANDA Nova CNP sent at 5/29/2025  7:37 AM EDT -----  Her dexa scan showed improvement from 2 years ago.  When was her last Prolia shot?

## 2025-05-29 NOTE — TELEPHONE ENCOUNTER
Ok sounds good, I can place orders for reclast which will be done at outpatient nursing/ they will call you to schedule this infusion  I did order bmp and vitamin d levels to be drawn prior to infusion. Labs are in the system.  Electronically signed by LASHANDA Galvin CNP on 5/29/2025 at 9:43 AM

## 2025-06-03 ENCOUNTER — HOSPITAL ENCOUNTER (OUTPATIENT)
Dept: WOMENS IMAGING | Age: 65
Discharge: HOME | End: 2025-06-03
Payer: MEDICARE

## 2025-06-03 ENCOUNTER — RESULTS FOLLOW-UP (OUTPATIENT)
Dept: FAMILY MEDICINE CLINIC | Age: 65
End: 2025-06-03

## 2025-06-03 DIAGNOSIS — R92.30 INCONCLUSIVE MAMMOGRAPHY DUE TO DENSE BREASTS: ICD-10-CM

## 2025-06-03 DIAGNOSIS — R92.2 INCONCLUSIVE MAMMOGRAPHY DUE TO DENSE BREASTS: ICD-10-CM

## 2025-06-03 PROCEDURE — 76641 ULTRASOUND BREAST COMPLETE: CPT

## 2025-06-04 ENCOUNTER — TELEPHONE (OUTPATIENT)
Dept: FAMILY MEDICINE CLINIC | Age: 65
End: 2025-06-04

## 2025-06-04 ENCOUNTER — RESULTS FOLLOW-UP (OUTPATIENT)
Dept: FAMILY MEDICINE CLINIC | Age: 65
End: 2025-06-04

## 2025-06-04 ENCOUNTER — HOSPITAL ENCOUNTER (OUTPATIENT)
Age: 65
Discharge: HOME | End: 2025-06-04
Payer: MEDICARE

## 2025-06-04 DIAGNOSIS — M81.0 AGE-RELATED OSTEOPOROSIS WITHOUT CURRENT PATHOLOGICAL FRACTURE: Primary | ICD-10-CM

## 2025-06-04 DIAGNOSIS — M81.0 AGE-RELATED OSTEOPOROSIS WITHOUT CURRENT PATHOLOGICAL FRACTURE: ICD-10-CM

## 2025-06-04 LAB
25(OH)D3 SERPL-MCNC: 61 NG/ML (ref 30–100)
ANION GAP SERPL CALC-SCNC: 12 MEQ/L (ref 8–16)
BUN SERPL-MCNC: 17 MG/DL (ref 8–23)
CALCIUM SERPL-MCNC: 9.4 MG/DL (ref 8.8–10.2)
CHLORIDE SERPL-SCNC: 101 MEQ/L (ref 98–111)
CO2 SERPL-SCNC: 26 MEQ/L (ref 22–29)
CREAT SERPL-MCNC: 0.8 MG/DL (ref 0.5–0.9)
GFR SERPL CREATININE-BSD FRML MDRD: 81 ML/MIN/1.73M2
GLUCOSE SERPL-MCNC: 90 MG/DL (ref 74–109)
POTASSIUM SERPL-SCNC: 4.2 MEQ/L (ref 3.5–5.2)
SODIUM SERPL-SCNC: 139 MEQ/L (ref 135–145)

## 2025-06-04 PROCEDURE — 82306 VITAMIN D 25 HYDROXY: CPT

## 2025-06-04 PROCEDURE — 80048 BASIC METABOLIC PNL TOTAL CA: CPT

## 2025-06-04 PROCEDURE — 36415 COLL VENOUS BLD VENIPUNCTURE: CPT

## 2025-06-04 RX ORDER — ONDANSETRON 2 MG/ML
8 INJECTION INTRAMUSCULAR; INTRAVENOUS
OUTPATIENT
Start: 2025-06-04

## 2025-06-04 RX ORDER — ALBUTEROL SULFATE 90 UG/1
4 INHALANT RESPIRATORY (INHALATION) PRN
OUTPATIENT
Start: 2025-06-04

## 2025-06-04 RX ORDER — SODIUM CHLORIDE 9 MG/ML
INJECTION, SOLUTION INTRAVENOUS CONTINUOUS
OUTPATIENT
Start: 2025-06-04

## 2025-06-04 RX ORDER — DIPHENHYDRAMINE HYDROCHLORIDE 50 MG/ML
50 INJECTION, SOLUTION INTRAMUSCULAR; INTRAVENOUS
OUTPATIENT
Start: 2025-06-04

## 2025-06-04 RX ORDER — ZOLEDRONIC ACID 0.05 MG/ML
5 INJECTION, SOLUTION INTRAVENOUS ONCE
OUTPATIENT
Start: 2025-06-04 | End: 2025-06-04

## 2025-06-04 RX ORDER — HYDROCORTISONE SODIUM SUCCINATE 100 MG/2ML
100 INJECTION INTRAMUSCULAR; INTRAVENOUS
OUTPATIENT
Start: 2025-06-04

## 2025-06-04 RX ORDER — SODIUM CHLORIDE 9 MG/ML
5-250 INJECTION, SOLUTION INTRAVENOUS PRN
OUTPATIENT
Start: 2025-06-04

## 2025-06-04 RX ORDER — ACETAMINOPHEN 325 MG/1
650 TABLET ORAL
OUTPATIENT
Start: 2025-06-04

## 2025-06-04 RX ORDER — EPINEPHRINE 1 MG/ML
0.3 INJECTION, SOLUTION, CONCENTRATE INTRAVENOUS PRN
OUTPATIENT
Start: 2025-06-04

## 2025-06-04 NOTE — TELEPHONE ENCOUNTER
Jenae Geroniom, APRN - CNP  [facility] Clinical Staff    Labs look great  No changes at this time

## 2025-06-16 ENCOUNTER — TELEPHONE (OUTPATIENT)
Dept: FAMILY MEDICINE CLINIC | Age: 65
End: 2025-06-16

## 2025-06-16 NOTE — TELEPHONE ENCOUNTER
Pt states she received a message from our office, the reclast was approved by insurance but insurance states they have not received a PA for it. She would like to know what is going on with it?

## 2025-06-17 NOTE — TELEPHONE ENCOUNTER
Out patient nursing takes care of the reclast PA. She said it's not a quick process so may take some time.

## 2025-06-17 NOTE — TELEPHONE ENCOUNTER
View All Conversations on this Encounter  Jenae Geronimo, APRN - CNP  [facility] Clinical Staff15 hours ago (5:03 PM)       Do we do prior auths for reclast ? Or does outpatient nursing?  Please verify with outpatient nursing and see if they have done PA for Reclast.

## 2025-07-10 ENCOUNTER — HOSPITAL ENCOUNTER (OUTPATIENT)
Dept: NURSING | Age: 65
Discharge: HOME | End: 2025-07-10
Payer: MEDICARE

## 2025-07-10 VITALS
TEMPERATURE: 97.9 F | HEART RATE: 64 BPM | DIASTOLIC BLOOD PRESSURE: 70 MMHG | RESPIRATION RATE: 16 BRPM | SYSTOLIC BLOOD PRESSURE: 128 MMHG | OXYGEN SATURATION: 97 % | BODY MASS INDEX: 23.3 KG/M2 | WEIGHT: 140 LBS

## 2025-07-10 DIAGNOSIS — M81.0 AGE-RELATED OSTEOPOROSIS WITHOUT CURRENT PATHOLOGICAL FRACTURE: Primary | ICD-10-CM

## 2025-07-10 PROCEDURE — 6360000002 HC RX W HCPCS: Performed by: NURSE PRACTITIONER

## 2025-07-10 PROCEDURE — 96365 THER/PROPH/DIAG IV INF INIT: CPT

## 2025-07-10 RX ORDER — ALBUTEROL SULFATE 90 UG/1
4 INHALANT RESPIRATORY (INHALATION) PRN
Status: CANCELLED | OUTPATIENT
Start: 2026-07-05

## 2025-07-10 RX ORDER — EPINEPHRINE 1 MG/ML
0.3 INJECTION, SOLUTION INTRAMUSCULAR; SUBCUTANEOUS PRN
Status: CANCELLED | OUTPATIENT
Start: 2026-07-05

## 2025-07-10 RX ORDER — ACETAMINOPHEN 325 MG/1
650 TABLET ORAL
Status: CANCELLED | OUTPATIENT
Start: 2026-07-05

## 2025-07-10 RX ORDER — ZOLEDRONIC ACID 0.05 MG/ML
5 INJECTION, SOLUTION INTRAVENOUS ONCE
Status: CANCELLED | OUTPATIENT
Start: 2026-07-05 | End: 2026-07-05

## 2025-07-10 RX ORDER — ONDANSETRON 2 MG/ML
8 INJECTION INTRAMUSCULAR; INTRAVENOUS
Status: CANCELLED | OUTPATIENT
Start: 2026-07-05

## 2025-07-10 RX ORDER — SODIUM CHLORIDE 9 MG/ML
INJECTION, SOLUTION INTRAVENOUS CONTINUOUS
Status: CANCELLED | OUTPATIENT
Start: 2026-07-05

## 2025-07-10 RX ORDER — DIPHENHYDRAMINE HYDROCHLORIDE 50 MG/ML
50 INJECTION, SOLUTION INTRAMUSCULAR; INTRAVENOUS
Status: CANCELLED | OUTPATIENT
Start: 2026-07-05

## 2025-07-10 RX ORDER — SODIUM CHLORIDE 9 MG/ML
5-250 INJECTION, SOLUTION INTRAVENOUS PRN
Status: DISCONTINUED | OUTPATIENT
Start: 2025-07-10 | End: 2025-07-10

## 2025-07-10 RX ORDER — HYDROCORTISONE SODIUM SUCCINATE 100 MG/2ML
100 INJECTION INTRAMUSCULAR; INTRAVENOUS
Status: CANCELLED | OUTPATIENT
Start: 2026-07-05

## 2025-07-10 RX ORDER — ZOLEDRONIC ACID 0.05 MG/ML
5 INJECTION, SOLUTION INTRAVENOUS ONCE
Status: COMPLETED | OUTPATIENT
Start: 2025-07-10 | End: 2025-07-10

## 2025-07-10 RX ORDER — SODIUM CHLORIDE 9 MG/ML
5-250 INJECTION, SOLUTION INTRAVENOUS PRN
Status: CANCELLED | OUTPATIENT
Start: 2026-07-05

## 2025-07-10 RX ADMIN — ZOLEDRONIC ACID 5 MG: 5 INJECTION, SOLUTION INTRAVENOUS at 13:11

## 2025-07-10 ASSESSMENT — PAIN DESCRIPTION - ORIENTATION: ORIENTATION: RIGHT

## 2025-07-10 ASSESSMENT — PAIN SCALES - GENERAL: PAINLEVEL_OUTOF10: 6

## 2025-07-10 ASSESSMENT — PAIN DESCRIPTION - LOCATION: LOCATION: WRIST

## 2025-07-10 NOTE — DISCHARGE INSTRUCTIONS
RECLAST DISCHARGE INSTRUCTIONS    DIET:  Drink extra fluids.  ACTIVITY : Usual     1.  Continue to take adequate calcium and vitamin D.  Recommend calcium 1200 mg per day and vitamin D 400 - 800 units daily.    2.  If flu like symptoms - fever, muscle soreness or headache take Tylenol or Ibuprofen.  Symptoms may last up to 3 days, sometimes 7 - 14 days.    3.  Follow up with ordering physician.    4.  Any problems return to emergency room.    5.  Continue usual medication.    MEDICATION GIVEN YEARLY.  CONSULT YOUR DOCTOR FOR NEXT DOSE.

## 2025-07-23 DIAGNOSIS — E53.8 VITAMIN B12 DEFICIENCY: ICD-10-CM

## 2025-07-23 RX ORDER — SYRINGE, DISPOSABLE, 1 ML
SYRINGE, EMPTY DISPOSABLE MISCELLANEOUS
Qty: 12 EACH | Status: SHIPPED | OUTPATIENT
Start: 2025-07-23

## 2025-07-23 RX ORDER — BUPROPION HYDROCHLORIDE 150 MG/1
150 TABLET ORAL EVERY MORNING
Qty: 30 TABLET | Refills: 0 | Status: SHIPPED | OUTPATIENT
Start: 2025-07-23

## 2025-07-23 RX ORDER — TRAZODONE HYDROCHLORIDE 50 MG/1
TABLET ORAL
Qty: 30 TABLET | Refills: 0 | Status: SHIPPED | OUTPATIENT
Start: 2025-07-23

## 2025-07-23 RX ORDER — FLUOXETINE 10 MG/1
10 CAPSULE ORAL DAILY
Qty: 30 CAPSULE | Refills: 0 | Status: SHIPPED | OUTPATIENT
Start: 2025-07-23

## 2025-07-23 NOTE — TELEPHONE ENCOUNTER
Patient returned my call. She reports she needs refills on the following prescriptions and mentions she will be going out of town for two weeks:  Requested Prescriptions     Pending Prescriptions Disp Refills    FLUoxetine (PROZAC) 10 MG capsule [Pharmacy Med Name: FLUOXETINE HCL 10 MG CAPSULE] 30 capsule 0     Sig: TAKE 1 CAPSULE BY MOUTH DAILY TAKE WITH 40 MG TO EQUAL 50 MG DAILY    traZODone (DESYREL) 50 MG tablet 30 tablet 0     Sig: Take 1/2-1 tablet PO HS prn Sleep    buPROPion (WELLBUTRIN XL) 150 MG extended release tablet 30 tablet 0     Sig: Take 1 tablet by mouth every morning     Patient declined need for 40 mg Fluoxetine at this time. All Rx's pending for 30 day supply, 0 refills.       Date of last visit: 5/6/2025  Date of next visit (if applicable):8/19/2025  Date Rx last sent by provider: 5/6/2025  Pharmacy Name: [facility]

## 2025-07-23 NOTE — TELEPHONE ENCOUNTER
Left message for patient to return my call to determine if she needs refill prior to next appt and if she needs any other refills as well.

## 2025-07-29 ENCOUNTER — APPOINTMENT (OUTPATIENT)
Dept: PRIMARY CARE | Facility: CLINIC | Age: 65
End: 2025-07-29
Payer: MEDICARE

## 2025-08-09 ENCOUNTER — SPECIALTY PHARMACY (OUTPATIENT)
Dept: PHARMACY | Facility: CLINIC | Age: 65
End: 2025-08-09

## 2025-08-09 DIAGNOSIS — M81.0 OSTEOPOROSIS, UNSPECIFIED OSTEOPOROSIS TYPE, UNSPECIFIED PATHOLOGICAL FRACTURE PRESENCE: ICD-10-CM

## 2025-08-09 RX ORDER — DENOSUMAB 60 MG/ML
60 INJECTION SUBCUTANEOUS
Qty: 1 ML | Refills: 1 | Status: SHIPPED | OUTPATIENT
Start: 2025-08-09

## 2025-08-18 ENCOUNTER — SPECIALTY PHARMACY (OUTPATIENT)
Dept: PHARMACY | Facility: CLINIC | Age: 65
End: 2025-08-18

## 2025-08-18 ASSESSMENT — PATIENT HEALTH QUESTIONNAIRE - PHQ9
10. IF YOU CHECKED OFF ANY PROBLEMS, HOW DIFFICULT HAVE THESE PROBLEMS MADE IT FOR YOU TO DO YOUR WORK, TAKE CARE OF THINGS AT HOME, OR GET ALONG WITH OTHER PEOPLE: SOMEWHAT DIFFICULT
7. TROUBLE CONCENTRATING ON THINGS, SUCH AS READING THE NEWSPAPER OR WATCHING TELEVISION: NOT AT ALL
SUM OF ALL RESPONSES TO PHQ QUESTIONS 1-9: 5
2. FEELING DOWN, DEPRESSED OR HOPELESS: SEVERAL DAYS
9. THOUGHTS THAT YOU WOULD BE BETTER OFF DEAD, OR OF HURTING YOURSELF: NOT AT ALL
4. FEELING TIRED OR HAVING LITTLE ENERGY: SEVERAL DAYS
1. LITTLE INTEREST OR PLEASURE IN DOING THINGS: SEVERAL DAYS
4. FEELING TIRED OR HAVING LITTLE ENERGY: SEVERAL DAYS
2. FEELING DOWN, DEPRESSED OR HOPELESS: SEVERAL DAYS
3. TROUBLE FALLING OR STAYING ASLEEP: SEVERAL DAYS
5. POOR APPETITE OR OVEREATING: NOT AT ALL
10. IF YOU CHECKED OFF ANY PROBLEMS, HOW DIFFICULT HAVE THESE PROBLEMS MADE IT FOR YOU TO DO YOUR WORK, TAKE CARE OF THINGS AT HOME, OR GET ALONG WITH OTHER PEOPLE: SOMEWHAT DIFFICULT
9. THOUGHTS THAT YOU WOULD BE BETTER OFF DEAD, OR OF HURTING YOURSELF: NOT AT ALL
8. MOVING OR SPEAKING SO SLOWLY THAT OTHER PEOPLE COULD HAVE NOTICED. OR THE OPPOSITE, BEING SO FIGETY OR RESTLESS THAT YOU HAVE BEEN MOVING AROUND A LOT MORE THAN USUAL: NOT AT ALL
6. FEELING BAD ABOUT YOURSELF - OR THAT YOU ARE A FAILURE OR HAVE LET YOURSELF OR YOUR FAMILY DOWN: SEVERAL DAYS
5. POOR APPETITE OR OVEREATING: NOT AT ALL
SUM OF ALL RESPONSES TO PHQ QUESTIONS 1-9: 5
3. TROUBLE FALLING OR STAYING ASLEEP: SEVERAL DAYS
7. TROUBLE CONCENTRATING ON THINGS, SUCH AS READING THE NEWSPAPER OR WATCHING TELEVISION: NOT AT ALL
6. FEELING BAD ABOUT YOURSELF - OR THAT YOU ARE A FAILURE OR HAVE LET YOURSELF OR YOUR FAMILY DOWN: SEVERAL DAYS
8. MOVING OR SPEAKING SO SLOWLY THAT OTHER PEOPLE COULD HAVE NOTICED. OR THE OPPOSITE - BEING SO FIDGETY OR RESTLESS THAT YOU HAVE BEEN MOVING AROUND A LOT MORE THAN USUAL: NOT AT ALL
SUM OF ALL RESPONSES TO PHQ QUESTIONS 1-9: 5
SUM OF ALL RESPONSES TO PHQ QUESTIONS 1-9: 5
1. LITTLE INTEREST OR PLEASURE IN DOING THINGS: SEVERAL DAYS
SUM OF ALL RESPONSES TO PHQ QUESTIONS 1-9: 5

## 2025-08-19 ENCOUNTER — TELEMEDICINE (OUTPATIENT)
Dept: PSYCHIATRY | Age: 65
End: 2025-08-19
Payer: MEDICARE

## 2025-08-19 DIAGNOSIS — F34.1 PERSISTENT DEPRESSIVE DISORDER: Primary | ICD-10-CM

## 2025-08-19 DIAGNOSIS — F43.0 ACUTE REACTION TO STRESS: ICD-10-CM

## 2025-08-19 DIAGNOSIS — G47.00 INSOMNIA, UNSPECIFIED TYPE: ICD-10-CM

## 2025-08-19 DIAGNOSIS — F41.1 GENERALIZED ANXIETY DISORDER: ICD-10-CM

## 2025-08-19 PROCEDURE — 99214 OFFICE O/P EST MOD 30 MIN: CPT

## 2025-08-19 PROCEDURE — 1160F RVW MEDS BY RX/DR IN RCRD: CPT

## 2025-08-19 PROCEDURE — 1123F ACP DISCUSS/DSCN MKR DOCD: CPT

## 2025-08-19 PROCEDURE — 1159F MED LIST DOCD IN RCRD: CPT

## 2025-08-19 RX ORDER — FLUOXETINE HYDROCHLORIDE 40 MG/1
40 CAPSULE ORAL DAILY
Qty: 30 CAPSULE | Refills: 2 | Status: SHIPPED | OUTPATIENT
Start: 2025-08-19

## 2025-08-19 RX ORDER — BUPROPION HYDROCHLORIDE 150 MG/1
150 TABLET ORAL EVERY MORNING
Qty: 90 TABLET | Refills: 0 | Status: SHIPPED | OUTPATIENT
Start: 2025-08-19 | End: 2025-11-17

## 2025-08-19 RX ORDER — FLUOXETINE 10 MG/1
10 CAPSULE ORAL DAILY
Qty: 30 CAPSULE | Refills: 2 | Status: SHIPPED | OUTPATIENT
Start: 2025-08-19

## 2025-08-25 RX ORDER — TRAZODONE HYDROCHLORIDE 50 MG/1
TABLET ORAL
Qty: 30 TABLET | Refills: 2 | Status: SHIPPED | OUTPATIENT
Start: 2025-08-25

## 2025-08-28 ENCOUNTER — SPECIALTY PHARMACY (OUTPATIENT)
Dept: PHARMACY | Facility: CLINIC | Age: 65
End: 2025-08-28

## 2025-09-04 ENCOUNTER — SPECIALTY PHARMACY (OUTPATIENT)
Dept: PHARMACY | Facility: CLINIC | Age: 65
End: 2025-09-04